# Patient Record
Sex: FEMALE | Race: BLACK OR AFRICAN AMERICAN | NOT HISPANIC OR LATINO | Employment: FULL TIME | URBAN - METROPOLITAN AREA
[De-identification: names, ages, dates, MRNs, and addresses within clinical notes are randomized per-mention and may not be internally consistent; named-entity substitution may affect disease eponyms.]

---

## 2017-03-21 ENCOUNTER — HOSPITAL ENCOUNTER (EMERGENCY)
Facility: HOSPITAL | Age: 42
Discharge: HOME | End: 2017-03-21
Attending: SURGERY
Payer: MEDICAID

## 2017-03-21 VITALS
BODY MASS INDEX: 22.54 KG/M2 | SYSTOLIC BLOOD PRESSURE: 127 MMHG | TEMPERATURE: 98 F | HEIGHT: 71 IN | DIASTOLIC BLOOD PRESSURE: 77 MMHG | WEIGHT: 161 LBS | HEART RATE: 86 BPM

## 2017-03-21 DIAGNOSIS — M54.50 CHRONIC LOW BACK PAIN WITHOUT SCIATICA, UNSPECIFIED BACK PAIN LATERALITY: ICD-10-CM

## 2017-03-21 DIAGNOSIS — G89.29 CHRONIC LOW BACK PAIN WITHOUT SCIATICA, UNSPECIFIED BACK PAIN LATERALITY: ICD-10-CM

## 2017-03-21 DIAGNOSIS — F41.9 ANXIETY: Primary | ICD-10-CM

## 2017-03-21 LAB
ALBUMIN SERPL BCP-MCNC: 4 G/DL
ALP SERPL-CCNC: 92 U/L
ALT SERPL W/O P-5'-P-CCNC: 7 U/L
AMPHET+METHAMPHET UR QL: NEGATIVE
ANION GAP SERPL CALC-SCNC: 10 MMOL/L
ANISOCYTOSIS BLD QL SMEAR: SLIGHT
APTT BLDCRRT: 31.4 SEC
AST SERPL-CCNC: 14 U/L
BARBITURATES UR QL SCN>200 NG/ML: NEGATIVE
BASOPHILS # BLD AUTO: 0 K/UL
BASOPHILS NFR BLD: 0 %
BENZODIAZ UR QL SCN>200 NG/ML: NEGATIVE
BILIRUB SERPL-MCNC: 0.7 MG/DL
BILIRUB UR QL STRIP: ABNORMAL
BNP SERPL-MCNC: 13 PG/ML
BUN SERPL-MCNC: 10 MG/DL
BZE UR QL SCN: NEGATIVE
CALCIUM SERPL-MCNC: 9.6 MG/DL
CANNABINOIDS UR QL SCN: NORMAL
CHLORIDE SERPL-SCNC: 103 MMOL/L
CK MB SERPL-MCNC: 0.4 NG/ML
CK MB SERPL-RTO: 0.6 %
CK SERPL-CCNC: 66 U/L
CK SERPL-CCNC: 66 U/L
CLARITY UR: CLEAR
CO2 SERPL-SCNC: 26 MMOL/L
COLOR UR: YELLOW
CREAT SERPL-MCNC: 0.9 MG/DL
CREAT UR-MCNC: 214.7 MG/DL
D DIMER PPP IA.FEU-MCNC: 0.39 MG/L FEU
DIFFERENTIAL METHOD: ABNORMAL
EOSINOPHIL # BLD AUTO: 0 K/UL
EOSINOPHIL NFR BLD: 0.5 %
ERYTHROCYTE [DISTWIDTH] IN BLOOD BY AUTOMATED COUNT: 14.5 %
EST. GFR  (AFRICAN AMERICAN): >60 ML/MIN/1.73 M^2
EST. GFR  (NON AFRICAN AMERICAN): >60 ML/MIN/1.73 M^2
GLUCOSE SERPL-MCNC: 91 MG/DL
GLUCOSE UR QL STRIP: NEGATIVE
HCT VFR BLD AUTO: 40.1 %
HGB BLD-MCNC: 13.1 G/DL
HGB UR QL STRIP: NEGATIVE
INR PPP: 1.1
KETONES UR QL STRIP: ABNORMAL
LEUKOCYTE ESTERASE UR QL STRIP: NEGATIVE
LYMPHOCYTES # BLD AUTO: 1 K/UL
LYMPHOCYTES NFR BLD: 22.5 %
MCH RBC QN AUTO: 24.1 PG
MCHC RBC AUTO-ENTMCNC: 32.7 %
MCV RBC AUTO: 74 FL
METHADONE UR QL SCN>300 NG/ML: NEGATIVE
MONOCYTES # BLD AUTO: 0.7 K/UL
MONOCYTES NFR BLD: 14.9 %
NEUTROPHILS # BLD AUTO: 2.7 K/UL
NEUTROPHILS NFR BLD: 62.1 %
NITRITE UR QL STRIP: NEGATIVE
OPIATES UR QL SCN: NEGATIVE
PCP UR QL SCN>25 NG/ML: NEGATIVE
PH UR STRIP: 6 [PH] (ref 5–8)
PLATELET # BLD AUTO: 307 K/UL
PLATELET BLD QL SMEAR: ABNORMAL
PMV BLD AUTO: 9.6 FL
POTASSIUM SERPL-SCNC: 4.4 MMOL/L
PROT SERPL-MCNC: 8.8 G/DL
PROT UR QL STRIP: NEGATIVE
PROTHROMBIN TIME: 10.6 SEC
RBC # BLD AUTO: 5.43 M/UL
SODIUM SERPL-SCNC: 139 MMOL/L
SP GR UR STRIP: 1.02 (ref 1–1.03)
TOXICOLOGY INFORMATION: NORMAL
TROPONIN I SERPL DL<=0.01 NG/ML-MCNC: <0.006 NG/ML
TROPONIN I SERPL DL<=0.01 NG/ML-MCNC: <0.006 NG/ML
URN SPEC COLLECT METH UR: ABNORMAL
UROBILINOGEN UR STRIP-ACNC: NEGATIVE EU/DL
WBC # BLD AUTO: 4.36 K/UL

## 2017-03-21 PROCEDURE — 81003 URINALYSIS AUTO W/O SCOPE: CPT

## 2017-03-21 PROCEDURE — 82553 CREATINE MB FRACTION: CPT

## 2017-03-21 PROCEDURE — 82570 ASSAY OF URINE CREATININE: CPT

## 2017-03-21 PROCEDURE — 99284 EMERGENCY DEPT VISIT MOD MDM: CPT | Mod: 25

## 2017-03-21 PROCEDURE — 36415 COLL VENOUS BLD VENIPUNCTURE: CPT

## 2017-03-21 PROCEDURE — 85610 PROTHROMBIN TIME: CPT

## 2017-03-21 PROCEDURE — 85379 FIBRIN DEGRADATION QUANT: CPT

## 2017-03-21 PROCEDURE — 93005 ELECTROCARDIOGRAM TRACING: CPT

## 2017-03-21 PROCEDURE — 96372 THER/PROPH/DIAG INJ SC/IM: CPT

## 2017-03-21 PROCEDURE — 63600175 PHARM REV CODE 636 W HCPCS: Performed by: SURGERY

## 2017-03-21 PROCEDURE — 83880 ASSAY OF NATRIURETIC PEPTIDE: CPT

## 2017-03-21 PROCEDURE — 85025 COMPLETE CBC W/AUTO DIFF WBC: CPT

## 2017-03-21 PROCEDURE — 80053 COMPREHEN METABOLIC PANEL: CPT

## 2017-03-21 PROCEDURE — 85730 THROMBOPLASTIN TIME PARTIAL: CPT

## 2017-03-21 PROCEDURE — 93010 ELECTROCARDIOGRAM REPORT: CPT | Mod: ,,, | Performed by: INTERNAL MEDICINE

## 2017-03-21 PROCEDURE — 84484 ASSAY OF TROPONIN QUANT: CPT

## 2017-03-21 RX ORDER — CYCLOBENZAPRINE HCL 10 MG
10 TABLET ORAL 3 TIMES DAILY PRN
Qty: 10 TABLET | Refills: 0 | Status: SHIPPED | OUTPATIENT
Start: 2017-03-21 | End: 2017-03-21

## 2017-03-21 RX ORDER — CYCLOBENZAPRINE HCL 10 MG
10 TABLET ORAL 3 TIMES DAILY PRN
Qty: 10 TABLET | Refills: 0 | Status: SHIPPED | OUTPATIENT
Start: 2017-03-21 | End: 2017-03-26

## 2017-03-21 RX ORDER — KETOROLAC TROMETHAMINE 10 MG/1
10 TABLET, FILM COATED ORAL EVERY 6 HOURS PRN
Qty: 15 TABLET | Refills: 0 | Status: SHIPPED | OUTPATIENT
Start: 2017-03-21 | End: 2017-05-20

## 2017-03-21 RX ORDER — IBUPROFEN 200 MG
200 TABLET ORAL EVERY 6 HOURS PRN
COMMUNITY
End: 2017-05-20

## 2017-03-21 RX ORDER — KETOROLAC TROMETHAMINE 10 MG/1
10 TABLET, FILM COATED ORAL EVERY 6 HOURS PRN
Qty: 15 TABLET | Refills: 0 | Status: SHIPPED | OUTPATIENT
Start: 2017-03-21 | End: 2017-03-21

## 2017-03-21 RX ORDER — KETOROLAC TROMETHAMINE 30 MG/ML
60 INJECTION, SOLUTION INTRAMUSCULAR; INTRAVENOUS
Status: COMPLETED | OUTPATIENT
Start: 2017-03-21 | End: 2017-03-21

## 2017-03-21 RX ADMIN — KETOROLAC TROMETHAMINE 60 MG: 30 INJECTION, SOLUTION INTRAMUSCULAR at 12:03

## 2017-03-21 NOTE — ED TRIAGE NOTES
"Patient comes in today complaining of low back pain.  Pain is chronic but has gotten worse lately.  Patient works as a CNA.  She then states she has been having pain to her shoulder then indicates across the chest to both shoulders.  States she feels like she feels like she is "trying to have anxiety".  "

## 2017-03-21 NOTE — DISCHARGE INSTRUCTIONS
Understanding Anxiety Disorders  Almost everyone gets nervous now and then. Its normal to have knots in your stomach before a test, or for your heart to race on a first date. But an anxiety disorder is much more than a case of nerves. In fact, its symptoms may be overwhelming. But treatment can relieve many of these symptoms. Talking to your doctor is the first step.    What are anxiety disorders?  An anxiety disorder causes intense feelings of panic and fear. These feelings may arise for no apparent reason. And they tend to recur again and again. They may prevent you from coping with life and cause you great distress. As a result, you may avoid anything that triggers your fear. In extreme cases, you may never leave the house. Anxiety disorders may cause other symptoms, such as:  · Obsessive thoughts you cant control  · Constant nightmares or painful thoughts of the past  · Nausea, sweating, and muscle tension  · Difficulty sleeping or concentrating  What causes anxiety disorders?  Anxiety disorders tend to run in families. For some people, childhood abuse or neglect may play a role. For others, stressful life events or trauma may trigger anxiety disorders. Anxiety can trigger low self-esteem and poor coping skills.  Common anxiety disorders  · Panic disorder: This causes an intense fear of being in danger.  · Phobias: These are extreme fears of certain objects, places, or events.  · Obsessive-compulsive disorder: This causes you to have unwanted thoughts. You also may perform certain actions over and over.  · Posttraumatic stress disorder: This occurs in people who have survived a terrible ordeal. It can cause nightmares and flashbacks about the event.  · Generalized anxiety disorder: This causes constant worry that can greatly disrupt your life.   Getting better  You may believe that nothing can help you. Or, you might fear what others may think. But most anxiety symptoms can be eased. Having an anxiety  disorder is nothing to be ashamed of. Most people do best with treatment that combines medication and therapy. Although these arent cures, they can help you live a healthier life.  Date Last Reviewed: 2/11/2015  © 4592-6484 [employer]. [address] [place] [address], [place] [address]. All rights reserved. This information is not intended as a substitute for professional medical care. Always follow your healthcare professional's instructions.

## 2017-03-21 NOTE — ED PROVIDER NOTES
[facility]                                        March 21, 2017                   Chief Complaint  41 y.o. female with Back Pain and Anxiety    History of Present Illness  Destiny Moura presents to the emergency room with chronic low back pain issues  Patient states she has a history of scoliosis with chronic low back pain this past week  Patient has no lumbar pain on exam without step-off or deformity today, no bruising   Normal bowel movements and urination reported; no signs of cauda equina syndrome  Patient has good distal pulses and capillary refill, is neurovascular intact on exam now  Denies any acute trauma fall, this is chronic back pain that has been on & off for years  Patient also states she felt anxious this morning, normal sinus rhythm on EKG today  Patient has no cardiac history, states the pressure life have left her feeling anxious   Negative cardiac workup with a negative d-dimer 2 sets of cardiac enzymes in the ER    The history is provided by the patient  Medical history: Chlamydia and depression  Surgical history: D&C, hysterosalpingogram, pelvic laparoscopy  No Known Allergies     Review of Systems and Physical Exam     Review of Systems  -- Constitution - no fever, denies fatigue, no weakness, no chills  -- Eyes - no tearing or redness, no visual disturbance  -- Ear, Nose - no tinnitus or earache, no nasal congestion or discharge  -- Mouth,Throat - no sore throat, no toothache, normal voice, normal swallowing  -- Respiratory - denies cough and congestion, no shortness of breath, no TORRES  -- Cardiovascular - chest pain, no palpitations, denies claudication  -- Gastrointestinal - denies abdominal pain, nausea, vomiting, or diarrhea  -- Genitourinary - no dysuria, no denies flank pain, no hematuria or frequency   -- Musculoskeletal - back pain, negative for myalgias and arthralgias   -- Neurological - no headache, denies weakness or seizure; no LOC  -- Skin - denies pallor,  rash, or changes in skin. no hives or welts noted    Vital Signs  -- Her oral temperature is 97.9 °F (36.6 °C).  -- Her blood pressure is 127/77 and her pulse is 86.      Physical Exam  -- Nursing note and vitals reviewed  -- Constitutional: Appears well-developed and well-nourished  -- Head: Atraumatic. Normocephalic. No obvious abnormality  -- Eyes: Pupils are equal and reactive to light. Normal conjunctiva and lids  -- Cardiac: Normal rate, regular rhythm and normal heart sounds  -- Pulmonary: Normal respiratory effort, breath sounds clear to auscultation  -- Abdominal: Soft, no tenderness. Normal bowel sounds. Normal liver edge  -- Musculoskeletal: Normal range of motion, no effusions. Joints stable   -- Neurological: No focal deficits. Showed good interaction with staff  -- Vascular: Posterior tibial, dorsalis pedis and radial pulses 2+ bilaterally      Emergency Room Course     Treatment and Evaluation  -- The EKG findings today were without concerning findings from baseline   -- Chest x-ray showed no infiltrate and showed no acute pathology   -- L-spine x-rays showed no evidence of acute fracture or dislocation    -- The electrolytes drawn in the ER today were within normal limits   -- The CBC drawn in the ER today was within normal limits   -- The BNP drawn in the ER today were within normal limits   -- The d-dimer drawn in the ER today were within normal limits.   -- The PT, PTT, and INR were within normal limits.    -- The cardiac enzymes were within normal limits   -- The 2nd troponin drawn in the ER today was within normal limits    -- IM Toradol given today in the ER    Diagnosis  -- Anxiety  -- Chronic low back pain without sciatica    Disposition and Plan  -- Disposition: home  -- Condition: stable  -- Follow-up: Patient to follow up with a MD in 1-2 days.  -- I advised the patient that we have found no life threatening condition today  -- At this time, I believe the patient is clinically stable for  discharge.   -- The patient acknowledges that close follow up with a MD is required   -- Patient agrees to comply with all instruction and direction given in the ER    This note is dictated on Dragon Natural Speaking word recognition program.  There are word recognition mistakes that are occasionally missed on review.             Henrik Allen MD  03/21/17 9915

## 2017-03-21 NOTE — ED AVS SNAPSHOT
[facility]  [address] [id number]               Destiny Moura   3/21/2017 10:15 AM   ED    Description:  Female : 1975   Department:  [facility]           Your Care was Coordinated By:     Provider Role From To    Henrik Allen MD Attending Provider 17 1013 --      Reason for Visit     Back Pain     Anxiety           Diagnoses this Visit        Comments    Anxiety    -  Primary     Chronic low back pain without sciatica, unspecified back pain laterality           ED Disposition     ED Disposition Condition Comment    Discharge             To Do List            These Medications        Disp Refills Start End    ketorolac (TORADOL) 10 mg tablet 15 tablet 0 3/21/2017     Take 1 tablet (10 mg total) by mouth every 6 (six) hours as needed for Pain. - Oral    Pharmacy: [facility] [id number] Ph #: [phone]       cyclobenzaprine (FLEXERIL) 10 MG tablet 10 tablet 0 3/21/2017 3/26/2017    Take 1 tablet (10 mg total) by mouth 3 (three) times daily as needed for Muscle spasms. - Oral    [id number] [address] Ph #: [phone]         [facility]     [facility] -  Assistance  Registered nurses in the [facility] provide clinical advisement, health education, appointment booking, and other advisory services.  Call for this free service at [phone].             Medications           Message regarding Medications     Verify the changes and/or additions to your medication regime listed below are the same as discussed with your clinician today.  If any of these changes or additions are incorrect, please notify your healthcare provider.        START taking these NEW medications        Refills    ketorolac (TORADOL) 10 mg tablet 0    Sig: Take 1 tablet (10 mg total) by mouth every 6 (six) hours as needed for Pain.  "   Class: Normal    Route: Oral    cyclobenzaprine (FLEXERIL) 10 MG tablet 0    Sig: Take 1 tablet (10 mg total) by mouth 3 (three) times daily as needed for Muscle spasms.    Class: Normal    Route: Oral      These medications were administered today        Dose Freq    ketorolac injection 60 mg 60 mg ED 1 Time    Sig: Inject 2 mLs (60 mg total) into the muscle ED 1 Time.    Class: Normal    Route: Intramuscular           Verify that the below list of medications is an accurate representation of the medications you are currently taking.  If none reported, the list may be blank. If incorrect, please contact your healthcare provider. Carry this list with you in case of emergency.           Current Medications     ibuprofen (ADVIL,MOTRIN) 200 MG tablet Take 200 mg by mouth every 6 (six) hours as needed for Pain.    cyclobenzaprine (FLEXERIL) 10 MG tablet Take 1 tablet (10 mg total) by mouth 3 (three) times daily as needed for Muscle spasms.    ketorolac (TORADOL) 10 mg tablet Take 1 tablet (10 mg total) by mouth every 6 (six) hours as needed for Pain.           Clinical Reference Information           Your Vitals Were     BP Pulse Temp Height Weight Last Period    127/77 (BP Location: Left arm, Patient Position: Sitting) 86 97.9 °F (36.6 °C) (Oral) 5' 11" (1.803 m) 73 kg (161 lb) 02/28/2017    BMI                22.45 kg/m2          Allergies as of 3/21/2017     No Known Allergies      Immunizations Administered on Date of Encounter - 3/21/2017     None      ED Micro, Lab, POCT     Start Ordered       Status Ordering Provider    03/21/17 1201 03/21/17 1200  Troponin I  STAT      Final result     03/21/17 1026 03/21/17 1025  Comprehensive metabolic panel  STAT      Final result     03/21/17 1026 03/21/17 1025  CBC auto differential  STAT      Final result     03/21/17 1026 03/21/17 1025  Troponin I  Now then every 6 hours     Start Status   03/21/17 1026 Final result   03/21/17 1626 Scheduled   03/21/17 2226 Scheduled "   03/22/17 0426 Scheduled   03/22/17 1026 Scheduled   03/22/17 1626 Scheduled   03/22/17 2226 Scheduled   03/23/17 0426 Scheduled   03/23/17 1026 Scheduled   03/23/17 1626 Scheduled   03/23/17 2226 Scheduled   03/24/17 0426 Scheduled   03/24/17 1026 Scheduled   03/24/17 1626 Scheduled   03/24/17 2226 Scheduled       Acknowledged     03/21/17 1026 03/21/17 1025  CK  STAT      Final result     03/21/17 1026 03/21/17 1025  CK-MB  STAT      Final result     03/21/17 1026 03/21/17 1025  Brain natriuretic peptide  STAT      Final result     03/21/17 1026 03/21/17 1025  Protime-INR  STAT      Final result     03/21/17 1026 03/21/17 1025  APTT  STAT      Final result     03/21/17 1026 03/21/17 1025  D dimer, quantitative  STAT      Final result     03/21/17 1026 03/21/17 1025  Urinalysis  STAT      Final result     03/21/17 1026 03/21/17 1025  Drug screen panel, emergency  STAT      Final result       ED Imaging Orders     Start Ordered       Status Ordering Provider    03/21/17 1026 03/21/17 1025  X-Ray Chest PA And Lateral  1 time imaging      Final result     03/21/17 1022 03/21/17 1022  X-Ray Lumbar Spine Ap And Lateral  Daily imaging      Final result         Discharge Instructions         Understanding Anxiety Disorders  Almost everyone gets nervous now and then. Its normal to have knots in your stomach before a test, or for your heart to race on a first date. But an anxiety disorder is much more than a case of nerves. In fact, its symptoms may be overwhelming. But treatment can relieve many of these symptoms. Talking to your doctor is the first step.    What are anxiety disorders?  An anxiety disorder causes intense feelings of panic and fear. These feelings may arise for no apparent reason. And they tend to recur again and again. They may prevent you from coping with life and cause you great distress. As a result, you may avoid anything that triggers your fear. In extreme cases, you may never leave the house.  Anxiety disorders may cause other symptoms, such as:  · Obsessive thoughts you cant control  · Constant nightmares or painful thoughts of the past  · Nausea, sweating, and muscle tension  · Difficulty sleeping or concentrating  What causes anxiety disorders?  Anxiety disorders tend to run in families. For some people, childhood abuse or neglect may play a role. For others, stressful life events or trauma may trigger anxiety disorders. Anxiety can trigger low self-esteem and poor coping skills.  Common anxiety disorders  · Panic disorder: This causes an intense fear of being in danger.  · Phobias: These are extreme fears of certain objects, places, or events.  · Obsessive-compulsive disorder: This causes you to have unwanted thoughts. You also may perform certain actions over and over.  · Posttraumatic stress disorder: This occurs in people who have survived a terrible ordeal. It can cause nightmares and flashbacks about the event.  · Generalized anxiety disorder: This causes constant worry that can greatly disrupt your life.   Getting better  You may believe that nothing can help you. Or, you might fear what others may think. But most anxiety symptoms can be eased. Having an anxiety disorder is nothing to be ashamed of. Most people do best with treatment that combines medication and therapy. Although these arent cures, they can help you live a healthier life.  Date Last Reviewed: 2/11/2015 © 2000-2016 [employer]. [address] [place] [address], [place] [address]. All rights reserved. This information is not intended as a substitute for professional medical care. Always follow your healthcare professional's instructions.           [facility] complies with applicable Federal civil rights laws and does not discriminate on the basis of race, color, national origin, age, disability, or sex.        Language Assistance Services     ATTENTION: Language assistance services are available, free of  charge. Please call [phone].      ATENCIÓN: Si habla español, tiene a greco disposición servicios gratuitos de asistencia lingüística. Llame al [phone].     CHÚ Ý: N?u b?n nói Ti?ng Vi?t, có các d?ch v? h? tr? ngôn ng? mi?n phí dành cho b?n. G?i s? [phone].

## 2017-03-24 ENCOUNTER — TELEPHONE (OUTPATIENT)
Dept: OBSTETRICS AND GYNECOLOGY | Facility: CLINIC | Age: 42
End: 2017-03-24

## 2017-03-24 NOTE — TELEPHONE ENCOUNTER
----- Message from [place] sent at 3/24/2017  9:14 AM CDT -----  Contact: self  Destiny Moura  MRN: [id number]  : 1975  PCP: Primary Doctor No  Home Phone      [phone]  Work Phone      Not on file.  Mobile          [phone]      MESSAGE: had come here in  she was trying to get pregnant, barbara wanted her to have a test done here(hsg?) but it was $600, she had it done at [facility] in [place] so medicaid would pay for it. Please call to discuss her results. She has a copy of the results.    Phone: [phone]

## 2017-03-24 NOTE — TELEPHONE ENCOUNTER
Pt instructed to bring in report for MD review. Once reviewed, will advise of further recommendations. Verbalized understanding. Will bring in report to clinic.

## 2017-03-24 NOTE — TELEPHONE ENCOUNTER
Called patient to discuss results from her GYN in [facility]. Patient did not answer.     Please let patient know I have reviewed all the records. Her lab work was normal and her FSH/estradiol levels indicates she is not in menopause. Her HSG however states that the right fallopian tube is dilated and the left fallopian tube is blocked. This is very likely the cause of her inability to get pregnancy. Without the tubes being open, there is no way for the sperm to travel to the egg for conception. There isn't anything further that we can offer her to help her get pregnant. She will need to be seen at a fertility specialist for potential IVF.

## 2017-03-24 NOTE — TELEPHONE ENCOUNTER
Pt notified, verbalized understanding. Will contact fertility specialist for further evaluation.

## 2017-05-20 ENCOUNTER — HOSPITAL ENCOUNTER (EMERGENCY)
Facility: HOSPITAL | Age: 42
Discharge: HOME | End: 2017-05-20
Attending: SURGERY
Payer: MEDICAID

## 2017-05-20 VITALS
DIASTOLIC BLOOD PRESSURE: 65 MMHG | SYSTOLIC BLOOD PRESSURE: 99 MMHG | WEIGHT: 168 LBS | BODY MASS INDEX: 23.43 KG/M2 | HEART RATE: 97 BPM | RESPIRATION RATE: 20 BRPM | TEMPERATURE: 99 F

## 2017-05-20 DIAGNOSIS — K02.9 DENTAL CARIES: Primary | ICD-10-CM

## 2017-05-20 PROCEDURE — 63600175 PHARM REV CODE 636 W HCPCS: Performed by: SURGERY

## 2017-05-20 PROCEDURE — 96372 THER/PROPH/DIAG INJ SC/IM: CPT

## 2017-05-20 PROCEDURE — 25000003 PHARM REV CODE 250: Performed by: SURGERY

## 2017-05-20 PROCEDURE — 99283 EMERGENCY DEPT VISIT LOW MDM: CPT | Mod: 25

## 2017-05-20 RX ORDER — TRAMADOL HYDROCHLORIDE 50 MG/1
50 TABLET ORAL EVERY 6 HOURS PRN
Qty: 8 TABLET | Refills: 0 | Status: SHIPPED | OUTPATIENT
Start: 2017-05-20 | End: 2017-05-20

## 2017-05-20 RX ORDER — TRAMADOL HYDROCHLORIDE 50 MG/1
50 TABLET ORAL EVERY 6 HOURS PRN
Qty: 8 TABLET | Refills: 0 | Status: SHIPPED | OUTPATIENT
Start: 2017-05-20 | End: 2017-05-30

## 2017-05-20 RX ORDER — PENICILLIN V POTASSIUM 500 MG/1
500 TABLET, FILM COATED ORAL EVERY 6 HOURS
Qty: 28 TABLET | Refills: 0 | Status: SHIPPED | OUTPATIENT
Start: 2017-05-20 | End: 2017-07-15

## 2017-05-20 RX ORDER — KETOROLAC TROMETHAMINE 30 MG/ML
60 INJECTION, SOLUTION INTRAMUSCULAR; INTRAVENOUS
Status: COMPLETED | OUTPATIENT
Start: 2017-05-20 | End: 2017-05-20

## 2017-05-20 RX ORDER — CLINDAMYCIN PHOSPHATE 150 MG/ML
600 INJECTION, SOLUTION INTRAVENOUS
Status: COMPLETED | OUTPATIENT
Start: 2017-05-20 | End: 2017-05-20

## 2017-05-20 RX ORDER — KETOROLAC TROMETHAMINE 10 MG/1
10 TABLET, FILM COATED ORAL EVERY 6 HOURS PRN
Qty: 20 TABLET | Refills: 0 | Status: SHIPPED | OUTPATIENT
Start: 2017-05-20 | End: 2017-07-15

## 2017-05-20 RX ADMIN — KETOROLAC TROMETHAMINE 60 MG: 30 INJECTION, SOLUTION INTRAMUSCULAR at 10:05

## 2017-05-20 RX ADMIN — CLINDAMYCIN PHOSPHATE 600 MG: 150 INJECTION, SOLUTION INTRAMUSCULAR; INTRAVENOUS at 10:05

## 2017-05-20 NOTE — DISCHARGE INSTRUCTIONS
"    Dental Cavity    A dental cavity is a pit or crater in the surface of a tooth. This exposes the sensitive inner layer of the tooth and causes pain. If the cavity isnt treated, it will get bigger. It may enter the pulp and cause an infection or abscess in the bone at the root end (apex) of the tooth. An infection in the tooth is a much more serious problem than a cavity. If the tooth gets infected, you will need a root canal or the entire tooth taken out (extraction).  The pain in your tooth may be made worse by eating sweets or drinking hot or cold beverages. It may spread from the tooth to your ear or the area of your jaw on the same side.  Home care  Follow these tips when caring for yourself at home:  · Avoid sweets and hot and cold foods and drinks. Your tooth may be sensitive to changes in temperature.  · If your tooth is chipped or cracked, or if there is a large open cavity, put oil of cloves directly on the tooth to relieve pain. You can buy oil of cloves at drugstores. Some pharmacies carry an over-the-counter "toothache kit." This contains a paste that you can put on the exposed tooth to make it less sensitive.  · Put a cold pack on your jaw over the sore area to help reduce pain.  · You may use over-the-counter medicine to ease pain, unless another medicine was prescribed. If you have chronic liver or kidney disease, talk with your healthcare provider before using acetaminophen or ibuprofen. Also talk with your provider if youve had a stomach ulcer or GI bleeding.  · If you have signs of an infection, you will be given an antibiotic. Take it as directed.  Follow-up care  Follow up with your dentist, or as advised. Your pain may go away with the treatment given today. But only a dentist can fully look at and treat this problem to prevent further tooth damage.  Call 911  Call 911 if any of these occur:  · Difficulty swallowing or breathing  · Weakness or fainting  · Unusual drowsiness  · Headache or " stiff neck  When to seek medical advice  Call your healthcare provider right away if any of these occur:  · Redness or swelling of the face  · Pain gets worse or spreads to your neck  · Fever of 100.5 °F (38°C) or higher, or as directed by your healthcare provider  · Pus drains from the tooth or gum  Date Last Reviewed: 10/1/2016  © 6299-5982 [employer]. [address] [place] [address], [place] [address]. All rights reserved. This information is not intended as a substitute for professional medical care. Always follow your healthcare professional's instructions.

## 2017-05-20 NOTE — ED PROVIDER NOTES
"[facility]                                        May 20, 2017                   Chief Complaint  41 y.o. female with Dental Pain    History of Present Illness  Destiny Moura presents to the emergency room with chronic dental pain  Patient states she had a left upper molar extracted several months ago  Patient states now the adjacent to molars hurt, no facial swelling now  Patient has no fever or signs of dental abscess on ER evaluation today  Patient states she has 5/10 pain with chewing, "Toradol does not help"  Patient has normal swallowing with no infection of the soft tissue or gums  Patient states she does not have the financial opportunity to see a dentist    The history is provided by the patient    Past Medical History   -- Chlamydia    -- Depression      Past Surgical History   -- DILATION AND CURETTAGE OF UTERUS     -- hysterosalpingogram     -- PELVIC LAPAROSCOPY        No Known Allergies     Review of Systems and Physical Exam     Review of Systems  -- Constitution - no fever, denies fatigue, no weakness, no chills  -- Eyes - no tearing or redness, no visual disturbance  -- Ear, Nose - no tinnitus or earache, no nasal congestion or discharge  -- Mouth,Throat - toothache, normal voice, normal swallowing  -- Respiratory - denies cough and congestion, no shortness of breath, no TORRES  -- Cardiovascular - denies chest pain, no palpitations, denies claudication  -- Gastrointestinal - denies abdominal pain, nausea, vomiting, or diarrhea  -- Musculoskeletal - denies back pain, negative for myalgias and arthralgias   -- Neurological - no headache, denies weakness or seizure; no LOC  -- Skin - denies pallor, rash, or changes in skin. no hives or welts noted    Vital Signs  -- Her blood pressure is 99/65 and her pulse is 97. Her respiration is 20.      Physical Exam  -- Nursing note and vitals reviewed  -- Constitutional: Appears well-developed and well-nourished  -- Head: Atraumatic. " Normocephalic. No obvious abnormality  -- Eyes: Pupils are equal and reactive to light. Normal conjunctiva and lids  -- Nose: Nose normal in appearance, nares grossly normal. No discharge  -- Throat: several upper and lower bilateral molar cavities with no facial swelling     -- Ears: External ears and TM normal bilaterally. Normal hearing and no drainage  -- Neck: Normal range of motion. Neck supple. No masses, trachea midline  -- Cardiac: Normal rate, regular rhythm and normal heart sounds  -- Pulmonary: Normal respiratory effort, breath sounds clear to auscultation  -- Abdominal: Soft, no tenderness. Normal bowel sounds. Normal liver edge  -- Musculoskeletal: Normal range of motion, no effusions. Joints stable   -- Neurological: No focal deficits. Showed good interaction with staff  -- Vascular: Posterior tibial, dorsalis pedis and radial pulses 2+ bilaterally      Emergency Room Course     Medications Given  -- clindamycin injection 600 mg (600 mg Intramuscular Given 5/20/17 1014)   -- ketorolac injection 60 mg (60 mg Intramuscular Given 5/20/17 1014)     Diagnosis  -- The encounter diagnosis was Dental caries.    Disposition and Plan  -- Disposition: to dentist ASAP  -- Condition: stable  -- Pt given instructions; take all medications prescribed in the ER as directed.   -- Patient agrees to comply with all instructions- needs appropriate dental care  -- Pt agrees to return to ER if any symptoms reoccur; symptom-free on discharge  -- Patient to follow up with a dentist in 1-2 days. Has been given follow up information  -- The patient acknowledges that dental follow up is required for this issue    This note is dictated on Dragon Natural Speaking word recognition program.  There are word recognition mistakes that are occasionally missed on review.           Henrik Allen MD  05/20/17 2233

## 2017-05-20 NOTE — ED AVS SNAPSHOT
[facility]  [address] [facility] [address]               Destiny Moura   2017  9:51 AM   ED    Description:  Female : 1975   Department:  [facility]           Your Care was Coordinated By:     Provider Role From To    Henrik Allen MD Attending Provider 17 0959 --      Reason for Visit     Dental Pain           Diagnoses this Visit        Comments    Dental caries    -  Primary       ED Disposition     ED Disposition Condition Comment    Discharge             To Do List           Follow-up Information     Follow up with [facility] In 2 day(s).    Specialty:  Dental General Practice    Contact information:    [address] [address]  [phone]         These Medications        Disp Refills Start End    penicillin v potassium (VEETID) 500 MG tablet 28 tablet 0 2017     Take 1 tablet (500 mg total) by mouth every 6 (six) hours. - Oral    Pharmacy: [facility] [address] [facility] [address] Ph #: [phone]       ketorolac (TORADOL) 10 mg tablet 20 tablet 0 2017     Take 1 tablet (10 mg total) by mouth every 6 (six) hours as needed for Pain. - Oral    Pharmacy: [facility] - [address] - [address] Ph #: [phone]       tramadol (ULTRAM) 50 mg tablet 8 tablet 0 2017    Take 1 tablet (50 mg total) by mouth every 6 (six) hours as needed for Pain. - Oral    Pharmacy: [facility] [address] - [address] [facility] [address] Ph #: [phone]         [facility]     [facility] -  Assistance  Unless otherwise directed by your provider, please contact [facility], our nurse care line that is available for  assistance.     Registered nurses in the [facility] provide: appointment scheduling, clinical advisement, health education, and other advisory services.  Call: [phone] (toll free)                Medications           Message regarding Medications     Verify the changes and/or additions to your medication regime listed below are the same as discussed with your clinician today.  If any of these changes or additions are incorrect, please notify your healthcare provider.        START taking these NEW medications        Refills    penicillin v potassium (VEETID) 500 MG tablet 0    Sig: Take 1 tablet (500 mg total) by mouth every 6 (six) hours.    Class: Print    Route: Oral    ketorolac (TORADOL) 10 mg tablet 0    Sig: Take 1 tablet (10 mg total) by mouth every 6 (six) hours as needed for Pain.    Class: Print    Route: Oral    tramadol (ULTRAM) 50 mg tablet 0    Sig: Take 1 tablet (50 mg total) by mouth every 6 (six) hours as needed for Pain.    Class: Normal    Route: Oral      These medications were administered today        Dose Freq    clindamycin injection 600 mg 600 mg ED 1 Time    Sig: Inject 4 mLs (600 mg total) into the muscle ED 1 Time.    Class: Normal    Route: Intramuscular    ketorolac injection 60 mg 60 mg ED 1 Time    Sig: Inject 2 mLs (60 mg total) into the muscle ED 1 Time.    Class: Normal    Route: Intramuscular      STOP taking these medications     ibuprofen (ADVIL,MOTRIN) 200 MG tablet Take 200 mg by mouth every 6 (six) hours as needed for Pain.           Verify that the below list of medications is an accurate representation of the medications you are currently taking.  If none reported, the list may be blank. If incorrect, please contact your healthcare provider. Carry this list with you in case of emergency.           Current Medications     ketorolac (TORADOL) 10 mg tablet Take 1 tablet (10 mg total) by mouth every 6 (six) hours as needed for Pain.    penicillin v potassium (VEETID) 500 MG tablet Take 1 tablet (500 mg total) by mouth every 6 (six) hours.    tramadol (ULTRAM) 50 mg tablet Take 1 tablet (50 mg total) by mouth every 6 (six) hours as needed for Pain.           Clinical  "Reference Information           Your Vitals Were     BP Pulse Temp Resp Weight Last Period    99/65 (BP Location: Left arm) 97 98.8 °F (37.1 °C) (Oral) 20 76.2 kg (168 lb) 04/29/2017    BMI                23.43 kg/m2          Allergies as of 5/20/2017     No Known Allergies      Immunizations Administered on Date of Encounter - 5/20/2017     None      ED Micro, Lab, POCT     None      ED Imaging Orders     None        Discharge Instructions           Dental Cavity    A dental cavity is a pit or crater in the surface of a tooth. This exposes the sensitive inner layer of the tooth and causes pain. If the cavity isnt treated, it will get bigger. It may enter the pulp and cause an infection or abscess in the bone at the root end (apex) of the tooth. An infection in the tooth is a much more serious problem than a cavity. If the tooth gets infected, you will need a root canal or the entire tooth taken out (extraction).  The pain in your tooth may be made worse by eating sweets or drinking hot or cold beverages. It may spread from the tooth to your ear or the area of your jaw on the same side.  Home care  Follow these tips when caring for yourself at home:  · Avoid sweets and hot and cold foods and drinks. Your tooth may be sensitive to changes in temperature.  · If your tooth is chipped or cracked, or if there is a large open cavity, put oil of cloves directly on the tooth to relieve pain. You can buy oil of cloves at drugstores. Some pharmacies carry an over-the-counter "toothache kit." This contains a paste that you can put on the exposed tooth to make it less sensitive.  · Put a cold pack on your jaw over the sore area to help reduce pain.  · You may use over-the-counter medicine to ease pain, unless another medicine was prescribed. If you have chronic liver or kidney disease, talk with your healthcare provider before using acetaminophen or ibuprofen. Also talk with your provider if youve had a stomach ulcer or GI " bleeding.  · If you have signs of an infection, you will be given an antibiotic. Take it as directed.  Follow-up care  Follow up with your dentist, or as advised. Your pain may go away with the treatment given today. But only a dentist can fully look at and treat this problem to prevent further tooth damage.  Call 911  Call 911 if any of these occur:  · Difficulty swallowing or breathing  · Weakness or fainting  · Unusual drowsiness  · Headache or stiff neck  When to seek medical advice  Call your healthcare provider right away if any of these occur:  · Redness or swelling of the face  · Pain gets worse or spreads to your neck  · Fever of 100.5 °F (38°C) or higher, or as directed by your healthcare provider  · Pus drains from the tooth or gum  Date Last Reviewed: 10/1/2016  © 9966-8475 [employer]. [address] [place] [address]. All rights reserved. This information is not intended as a substitute for professional medical care. Always follow your healthcare professional's instructions.           [facility] complies with applicable Federal civil rights laws and does not discriminate on the basis of race, color, national origin, age, disability, or sex.        Language Assistance Services     ATTENTION: Language assistance services are available, free of charge. Please call [phone].      ATENCIÓN: Si habla brandon, tiene a greco disposición servicios gratuitos de asistencia lingüística. Llame al [phone].     JUAN Ý: N?u b?n nói Ti?ng Vi?t, có các d?ch v? h? tr? ngôn ng? mi?n phí dành cho b?n. G?i s? [phone].

## 2017-07-15 ENCOUNTER — HOSPITAL ENCOUNTER (EMERGENCY)
Facility: HOSPITAL | Age: 42
Discharge: HOME | End: 2017-07-15
Attending: SURGERY
Payer: MEDICAID

## 2017-07-15 VITALS
BODY MASS INDEX: 24.05 KG/M2 | SYSTOLIC BLOOD PRESSURE: 128 MMHG | DIASTOLIC BLOOD PRESSURE: 61 MMHG | TEMPERATURE: 99 F | HEIGHT: 70 IN | HEART RATE: 103 BPM | WEIGHT: 168 LBS

## 2017-07-15 DIAGNOSIS — R21 RASH AND NONSPECIFIC SKIN ERUPTION: Primary | ICD-10-CM

## 2017-07-15 PROCEDURE — 63600175 PHARM REV CODE 636 W HCPCS: Performed by: SURGERY

## 2017-07-15 PROCEDURE — 96372 THER/PROPH/DIAG INJ SC/IM: CPT

## 2017-07-15 PROCEDURE — 99283 EMERGENCY DEPT VISIT LOW MDM: CPT

## 2017-07-15 RX ORDER — CEPHALEXIN 500 MG/1
500 CAPSULE ORAL EVERY 6 HOURS
Qty: 28 CAPSULE | Refills: 0 | Status: SHIPPED | OUTPATIENT
Start: 2017-07-15 | End: 2017-07-22

## 2017-07-15 RX ORDER — DIPHENHYDRAMINE HCL 50 MG
50 CAPSULE ORAL EVERY 6 HOURS PRN
Qty: 20 CAPSULE | Refills: 0 | Status: ON HOLD | OUTPATIENT
Start: 2017-07-15 | End: 2019-06-20 | Stop reason: HOSPADM

## 2017-07-15 RX ORDER — METHYLPREDNISOLONE 4 MG/1
TABLET ORAL
Qty: 1 PACKAGE | Refills: 0 | Status: ON HOLD | OUTPATIENT
Start: 2017-07-15 | End: 2019-06-16 | Stop reason: CLARIF

## 2017-07-15 RX ORDER — METHYLPREDNISOLONE SOD SUCC 125 MG
125 VIAL (EA) INJECTION
Status: COMPLETED | OUTPATIENT
Start: 2017-07-15 | End: 2017-07-15

## 2017-07-15 RX ORDER — MELOXICAM 15 MG/1
15 TABLET ORAL DAILY
Status: ON HOLD | COMMUNITY
End: 2019-06-16 | Stop reason: CLARIF

## 2017-07-15 RX ORDER — DIPHENHYDRAMINE HYDROCHLORIDE 50 MG/ML
50 INJECTION INTRAMUSCULAR; INTRAVENOUS
Status: COMPLETED | OUTPATIENT
Start: 2017-07-15 | End: 2017-07-15

## 2017-07-15 RX ORDER — QUETIAPINE FUMARATE 25 MG/1
TABLET, FILM COATED ORAL
Status: ON HOLD | COMMUNITY
End: 2019-06-20 | Stop reason: HOSPADM

## 2017-07-15 RX ADMIN — METHYLPREDNISOLONE SODIUM SUCCINATE 125 MG: 125 INJECTION, POWDER, FOR SOLUTION INTRAMUSCULAR; INTRAVENOUS at 03:07

## 2017-07-15 RX ADMIN — DIPHENHYDRAMINE HYDROCHLORIDE 50 MG: 50 INJECTION, SOLUTION INTRAMUSCULAR; INTRAVENOUS at 03:07

## 2017-07-15 NOTE — ED PROVIDER NOTES
[facility]                                        July 15, 2017                   Chief Complaint  42 y.o. female with Rash (rash along hairline for 2 days)    History of Present Illness  Destiny Moura presents to the emergency room with hairline rash ×48 hours  Patient got her hair black 2 days ago with itching and pruritus since that dying  Patient on exam has mild dermatitis on the scalp, mild random alopecia now  Patient has no history of psoriasis or eczema, only started after doing her hair  Patient has no cellulitis, no signs of scalp desiccation on ER evaluation today    The history is provided by the patient  Medical history: Chlamydia and depression  Surgical history: D&C, hysterosalpingogram, pelvic laparoscopy  No known ALLERGIES  Social history: , employed, nonsmoker, children  Family history: Thyroid disease,    Review of Systems and Physical Exam     Review of Systems  -- Constitution - no fever, denies fatigue, no weakness, no chills  -- Eyes - no tearing or redness, no visual disturbance  -- Ear, Nose - no tinnitus or earache, no nasal congestion or discharge  -- Mouth,Throat - no sore throat, no toothache, normal voice, normal swallowing  -- Respiratory - denies cough and congestion, no shortness of breath, no TORRES  -- Cardiovascular - denies chest pain, no palpitations, denies claudication  -- Gastrointestinal - denies abdominal pain, nausea, vomiting, or diarrhea  -- Musculoskeletal - denies back pain, negative for myalgias and arthralgias   -- Neurological - no headache, denies weakness or seizure; no LOC  -- Skin - scalp rash after dying her hair black 2 days ago    Vital Signs  -- Her oral temperature is 98.6 °F (37 °C).   -- Her blood pressure is 128/61 and her pulse is 103.      Physical Exam  -- Nursing note and vitals reviewed  -- Constitutional: Appears well-developed and well-nourished  -- Head: Atraumatic. Normocephalic. No obvious abnormality  -- Eyes: Pupils  are equal and reactive to light. Normal conjunctiva and lids  -- Nose: Nose normal in appearance, nares grossly normal. No discharge  -- Throat: Mucous membranes moist, pharynx normal, normal tonsils. No lesions   -- Ears: External ears and TM normal bilaterally. Normal hearing and no drainage  -- Neck: Normal range of motion. Neck supple. No masses, trachea midline  -- Cardiac: Normal rate, regular rhythm and normal heart sounds  -- Pulmonary: Normal respiratory effort, breath sounds clear to auscultation  -- Abdominal: Soft, no tenderness. Normal bowel sounds. Normal liver edge  -- Musculoskeletal: Normal range of motion, no effusions. Joints stable   -- Neurological: No focal deficits. Showed good interaction with staff  -- Vascular: Posterior tibial, dorsalis pedis and radial pulses 2+ bilaterally    -- Lymphatics: No cervical or peripheral lymphadenopathy. No edema noted  -- Skin: Nonspecific scalp rash/dermatitis    Emergency Room Course     Treatment and Evaluation  --  mg Solumedrol given today in the ER  -- IM 50 mg Benadryl given today in the ER    Diagnosis  -- The encounter diagnosis was Rash and nonspecific skin eruption.    Disposition and Plan  -- Disposition: home  -- Condition: stable  -- Follow-up: Patient to follow up with a MD in 1-2 days.  -- I advised the patient that we have found no life threatening condition today  -- At this time, I believe the patient is clinically stable for discharge.   -- The patient acknowledges that close follow up with a MD is required   -- Patient agrees to comply with all instruction and direction given in the ER    This note is dictated on Dragon Natural Speaking word recognition program.  There are word recognition mistakes that are occasionally missed on review.           Henrik Allen MD  07/15/17 4739

## 2017-07-15 NOTE — ED TRIAGE NOTES
Patient comes in today with complaint of rash around her hairline and on her scalp.  She dyed her hair prior to getting the rash.

## 2017-08-05 ENCOUNTER — HOSPITAL ENCOUNTER (EMERGENCY)
Facility: HOSPITAL | Age: 42
Discharge: HOME | End: 2017-08-05
Attending: EMERGENCY MEDICINE
Payer: MEDICAID

## 2017-08-05 VITALS
WEIGHT: 168 LBS | SYSTOLIC BLOOD PRESSURE: 122 MMHG | BODY MASS INDEX: 24.11 KG/M2 | TEMPERATURE: 99 F | RESPIRATION RATE: 16 BRPM | DIASTOLIC BLOOD PRESSURE: 82 MMHG | HEART RATE: 82 BPM

## 2017-08-05 DIAGNOSIS — S02.5XXA CLOSED FRACTURE OF TOOTH, INITIAL ENCOUNTER: Primary | ICD-10-CM

## 2017-08-05 PROCEDURE — 25000003 PHARM REV CODE 250: Performed by: EMERGENCY MEDICINE

## 2017-08-05 PROCEDURE — 99283 EMERGENCY DEPT VISIT LOW MDM: CPT

## 2017-08-05 RX ORDER — KETOROLAC TROMETHAMINE 10 MG/1
10 TABLET, FILM COATED ORAL
Status: COMPLETED | OUTPATIENT
Start: 2017-08-05 | End: 2017-08-05

## 2017-08-05 RX ORDER — TRAMADOL HYDROCHLORIDE 50 MG/1
50 TABLET ORAL EVERY 6 HOURS PRN
Qty: 12 TABLET | Refills: 0 | Status: SHIPPED | OUTPATIENT
Start: 2017-08-05 | End: 2017-08-15

## 2017-08-05 RX ADMIN — KETOROLAC TROMETHAMINE 10 MG: 10 TABLET, FILM COATED ORAL at 07:08

## 2017-08-05 NOTE — ED TRIAGE NOTES
C/O toothache for 4 days.  States tried to refill her tramodol, but was told she had to see a doctor.

## 2017-08-06 NOTE — DISCHARGE INSTRUCTIONS
Return if worsening pain, swelling, fever, discharge or other concerns.  Follow up with dentist this week.

## 2017-08-06 NOTE — ED PROVIDER NOTES
Encounter Date: 8/5/2017       History     Chief Complaint   Patient presents with    Dental Pain     42-year-old female presents with dental pain for the past few weeks.  She reports that she was seen here in June 30 for same.  She got Ultram at that time and her pain resolved.  For the past 4 days her pain has recurred.  She denies swelling of her face, discharge from her tooth or fever.  She tried to go to the pharmacist to get a refill on the Ultram she had that they denied her the refill.  He said that she needed to see the doctor before they could refill this prescription.          Review of patient's allergies indicates:  No Known Allergies  Past Medical History:   Diagnosis Date    Chlamydia     Dx'ed around 16-17    Depression      Past Surgical History:   Procedure Laterality Date    DILATION AND CURETTAGE OF UTERUS      hysterosalpingogram      Done at age 26 which demonstrated blockage of both tubes    PELVIC LAPAROSCOPY       Family History   Problem Relation Age of Onset    Thyroid disease Mother     No Known Problems Father     Breast cancer Neg Hx     Colon cancer Neg Hx     Ovarian cancer Neg Hx      Social History   Substance Use Topics    Smoking status: Never Smoker    Smokeless tobacco: Never Used    Alcohol use Yes      Comment: occasionally     Review of Systems   All other systems reviewed and are negative.      Physical Exam     Initial Vitals [08/05/17 1842]   BP Pulse Resp Temp SpO2   133/88 90 16 99 °F (37.2 °C) --      MAP       103         Physical Exam    Nursing note and vitals reviewed.  Constitutional: She appears well-developed and well-nourished. She is not diaphoretic. No distress.   Well-appearing, nontoxic, no trismus, no stridor, no drooling   HENT:   Head: Normocephalic and atraumatic.   Nose: Nose normal.   Mouth/Throat: Oropharynx is clear and moist.   Poor dentition, left lower second molar with fracture, no surrounding edema, erythema or fluctuance.   Eyes:  Conjunctivae and EOM are normal. Pupils are equal, round, and reactive to light. Right eye exhibits no discharge. Left eye exhibits no discharge.   Neck: Normal range of motion. Neck supple.   Cardiovascular: Normal rate, regular rhythm and normal heart sounds.   Pulmonary/Chest: Breath sounds normal. No respiratory distress. She has no wheezes. She has no rhonchi. She has no rales. She exhibits no tenderness.   Abdominal: Soft. Bowel sounds are normal. She exhibits no distension and no mass. There is no tenderness. There is no rebound and no guarding.   Neurological: She is alert and oriented to person, place, and time.   Skin: Skin is warm and dry. Capillary refill takes less than 2 seconds.   Psychiatric: She has a normal mood and affect. Her behavior is normal. Judgment and thought content normal.         ED Course   Procedures  Labs Reviewed - No data to display          Medical Decision Making:   Initial Assessment:   Dental fracture  Differential Diagnosis:   Periapical abscess, dental fracture, Humble's angina                   ED Course     Clinical Impression:   The encounter diagnosis was Closed fracture of tooth, initial encounter.                           Tha Patrick MD  08/05/17 7062

## 2017-08-29 ENCOUNTER — HOSPITAL ENCOUNTER (EMERGENCY)
Facility: HOSPITAL | Age: 42
Discharge: HOME | End: 2017-08-29
Attending: SURGERY
Payer: MEDICAID

## 2017-08-29 VITALS
RESPIRATION RATE: 16 BRPM | TEMPERATURE: 98 F | SYSTOLIC BLOOD PRESSURE: 126 MMHG | BODY MASS INDEX: 24.39 KG/M2 | HEART RATE: 88 BPM | DIASTOLIC BLOOD PRESSURE: 73 MMHG | WEIGHT: 170 LBS

## 2017-08-29 DIAGNOSIS — K04.7 DENTAL ABSCESS: Primary | ICD-10-CM

## 2017-08-29 LAB
ALBUMIN SERPL BCP-MCNC: 3.7 G/DL
ALP SERPL-CCNC: 86 U/L
ALT SERPL W/O P-5'-P-CCNC: 7 U/L
ANION GAP SERPL CALC-SCNC: 11 MMOL/L
AST SERPL-CCNC: 12 U/L
BASOPHILS # BLD AUTO: 0.01 K/UL
BASOPHILS NFR BLD: 0.1 %
BILIRUB SERPL-MCNC: 0.6 MG/DL
BUN SERPL-MCNC: 9 MG/DL
CALCIUM SERPL-MCNC: 9.3 MG/DL
CHLORIDE SERPL-SCNC: 107 MMOL/L
CO2 SERPL-SCNC: 22 MMOL/L
CREAT SERPL-MCNC: 0.8 MG/DL
DIFFERENTIAL METHOD: ABNORMAL
EOSINOPHIL # BLD AUTO: 0 K/UL
EOSINOPHIL NFR BLD: 0.2 %
ERYTHROCYTE [DISTWIDTH] IN BLOOD BY AUTOMATED COUNT: 15.1 %
EST. GFR  (AFRICAN AMERICAN): >60 ML/MIN/1.73 M^2
EST. GFR  (NON AFRICAN AMERICAN): >60 ML/MIN/1.73 M^2
GLUCOSE SERPL-MCNC: 94 MG/DL
HCT VFR BLD AUTO: 35.8 %
HGB BLD-MCNC: 11.5 G/DL
LACTATE SERPL-SCNC: 0.7 MMOL/L
LYMPHOCYTES # BLD AUTO: 1.1 K/UL
LYMPHOCYTES NFR BLD: 7.8 %
MCH RBC QN AUTO: 24.1 PG
MCHC RBC AUTO-ENTMCNC: 32.1 G/DL
MCV RBC AUTO: 75 FL
MONOCYTES # BLD AUTO: 1.5 K/UL
MONOCYTES NFR BLD: 10.8 %
NEUTROPHILS # BLD AUTO: 11.2 K/UL
NEUTROPHILS NFR BLD: 81.1 %
PLATELET # BLD AUTO: 313 K/UL
PMV BLD AUTO: 10.3 FL
POTASSIUM SERPL-SCNC: 3.3 MMOL/L
PROT SERPL-MCNC: 8.2 G/DL
RBC # BLD AUTO: 4.78 M/UL
SODIUM SERPL-SCNC: 140 MMOL/L
WBC # BLD AUTO: 13.76 K/UL

## 2017-08-29 PROCEDURE — 36415 COLL VENOUS BLD VENIPUNCTURE: CPT

## 2017-08-29 PROCEDURE — 99284 EMERGENCY DEPT VISIT MOD MDM: CPT | Mod: 25

## 2017-08-29 PROCEDURE — 96365 THER/PROPH/DIAG IV INF INIT: CPT

## 2017-08-29 PROCEDURE — 87040 BLOOD CULTURE FOR BACTERIA: CPT

## 2017-08-29 PROCEDURE — 85025 COMPLETE CBC W/AUTO DIFF WBC: CPT

## 2017-08-29 PROCEDURE — 63600175 PHARM REV CODE 636 W HCPCS: Performed by: SURGERY

## 2017-08-29 PROCEDURE — 96375 TX/PRO/DX INJ NEW DRUG ADDON: CPT

## 2017-08-29 PROCEDURE — 80053 COMPREHEN METABOLIC PANEL: CPT

## 2017-08-29 PROCEDURE — 83605 ASSAY OF LACTIC ACID: CPT

## 2017-08-29 PROCEDURE — 25000003 PHARM REV CODE 250: Performed by: SURGERY

## 2017-08-29 PROCEDURE — 25500020 PHARM REV CODE 255: Performed by: SURGERY

## 2017-08-29 RX ORDER — SODIUM CHLORIDE 9 MG/ML
1000 INJECTION, SOLUTION INTRAVENOUS
Status: COMPLETED | OUTPATIENT
Start: 2017-08-29 | End: 2017-08-29

## 2017-08-29 RX ORDER — HYDROMORPHONE HYDROCHLORIDE 1 MG/ML
1 INJECTION, SOLUTION INTRAMUSCULAR; INTRAVENOUS; SUBCUTANEOUS
Status: DISCONTINUED | OUTPATIENT
Start: 2017-08-29 | End: 2017-08-29

## 2017-08-29 RX ORDER — ONDANSETRON 2 MG/ML
4 INJECTION INTRAMUSCULAR; INTRAVENOUS
Status: DISCONTINUED | OUTPATIENT
Start: 2017-08-29 | End: 2017-08-29

## 2017-08-29 RX ORDER — HYDROMORPHONE HYDROCHLORIDE 2 MG/ML
2 INJECTION, SOLUTION INTRAMUSCULAR; INTRAVENOUS; SUBCUTANEOUS
Status: COMPLETED | OUTPATIENT
Start: 2017-08-29 | End: 2017-08-29

## 2017-08-29 RX ORDER — ONDANSETRON 2 MG/ML
4 INJECTION INTRAMUSCULAR; INTRAVENOUS
Status: COMPLETED | OUTPATIENT
Start: 2017-08-29 | End: 2017-08-29

## 2017-08-29 RX ADMIN — ONDANSETRON 4 MG: 2 INJECTION INTRAMUSCULAR; INTRAVENOUS at 09:08

## 2017-08-29 RX ADMIN — CEFTRIAXONE 1 G: 1 INJECTION, SOLUTION INTRAVENOUS at 09:08

## 2017-08-29 RX ADMIN — SODIUM CHLORIDE 1000 ML: 0.9 INJECTION, SOLUTION INTRAVENOUS at 08:08

## 2017-08-29 RX ADMIN — IOHEXOL 75 ML: 350 INJECTION, SOLUTION INTRAVENOUS at 09:08

## 2017-08-29 RX ADMIN — HYDROMORPHONE HYDROCHLORIDE 2 MG: 2 INJECTION, SOLUTION INTRAMUSCULAR; INTRAVENOUS; SUBCUTANEOUS at 09:08

## 2017-08-30 NOTE — ED PROVIDER NOTES
[facility]                                        August 29, 2017                   Chief Complaint  42 y.o. female with Dental Pain    History of Present Illness  Destiny Moura presents to the emergency room with left facial swelling tonight  Was seen in the emergency room yesterday and diagnosed with dental pain  Patient on exam has left facial swelling with obvious left molar dental abscess  Patient has no fever or complicating symptoms, 5/10 dental pain this evening  The patient states she cannot afford a dentist, no dentist except her Medicaid    The history is provided by the patient  Medical history: Chlamydia and depression  Surgical history: D&C, hysterosalpingogram, pelvic laparoscopy  No known ALLERGIES  Social history: , employed, nonsmoker, children  Family history: Thyroid disease,    Review of Systems and Physical Exam     Review of Systems  -- Constitution - no fever, denies fatigue, no weakness, no chills  -- Eyes - no tearing or redness, no visual disturbance  -- Ear, Nose - no tinnitus or earache, no nasal congestion or discharge  -- Mouth,Throat - toothache, normal voice, normal swallowing  -- Respiratory - denies cough and congestion, no shortness of breath, no TORRES  -- Cardiovascular - denies chest pain, no palpitations, denies claudication  -- Gastrointestinal - denies abdominal pain, nausea, vomiting, or diarrhea  -- Musculoskeletal - denies back pain, negative for myalgias and arthralgias   -- Neurological - no headache, denies weakness or seizure; no LOC  -- Skin - denies pallor, rash, or changes in skin. no hives or welts noted    Vital Signs  -- Her oral temperature is 98.2 °F (36.8 °C).   -- Her blood pressure is 126/73 and her pulse is 88.   -- Her respiration is 16.      Physical Exam  -- Nursing note and vitals reviewed  -- Constitutional: Appears well-developed and well-nourished  -- Head: Atraumatic. Normocephalic. No obvious abnormality  -- Eyes: Pupils  are equal and reactive to light. Normal conjunctiva and lids  -- Nose: Nose normal in appearance, nares grossly normal. No discharge  -- Throat: Left lower molar cavity with adjacent facial swelling noted  -- Ears: External ears and TM normal bilaterally. Normal hearing and no drainage  -- Neck: Normal range of motion. Neck supple. No masses, trachea midline  -- Cardiac: Normal rate, regular rhythm and normal heart sounds  -- Pulmonary: Normal respiratory effort, breath sounds clear to auscultation  -- Abdominal: Soft, no tenderness. Normal bowel sounds. Normal liver edge  -- Musculoskeletal: Normal range of motion, no effusions. Joints stable   -- Neurological: No focal deficits. Showed good interaction with staff  -- Vascular: Posterior tibial, dorsalis pedis and radial pulses 2+ bilaterally      Emergency Room Course     Treatment and Evaluation  -- CT of the face shows a left lower dental abscess  -- Lactic Acid drawn in the ER today was normal   -- Blood cultures have also been drawn, results are pending     Lab values  --    -- K 3.3 (L)   --    -- CO2 22 (L)   -- BUN 9   -- CREATININE 0.8   -- GLU 94   -- ALKPHOS 86   -- AST 12   -- ALT 7 (L)   -- BILITOT 0.6   -- ALBUMIN 3.7   -- PROT 8.2   -- WBC 13.76 (H)   -- HGB 11.5 (L)   -- HCT 35.8 (L)   --    -- CPK 66   -- CPK 66   -- CPKMB 0.4   -- TROPONINI <0.006   -- INR 1.1   -- BNP 13   -- DDIMER 0.39   -- LACTATE 0.7     Medications Given  -- 0.9%  NaCl infusion (0 mLs Intravenous Stopped 8/29/17 2100)   -- hydromorphone (PF) injection 2 mg (2 mg Intravenous Given 8/29/17 2126)   -- ondansetron injection 4 mg (4 mg Intravenous Given 8/29/17 2126)   -- cefTRIAXone (ROCEPHIN) 1 g in dextrose 5 % 50 mL IVPB    -- omnipaque 350 iohexol 75 mL (75 mLs Intravenous Given 8/29/17 2113)     Medical Decision Making  -- Diagnosis management comments: 42 y.o. female with left facial swelling today  -- Patient was seen in the ER last night, started on  penicillin by Dr. Carter  -- Patient states she had increased left facial swelling today, return to the ER  -- Normal white count, no double lactic acid, CT face: Left dental lower abscess    Diagnosis  -- The encounter diagnosis was Dental abscess.    Disposition and Plan  -- Disposition: to dentist ASAP  -- Condition: stable  -- Pt given instructions; take all medications prescribed in the ER as directed.   -- Patient agrees to comply with all instructions- needs appropriate dental care  -- Pt agrees to return to ER if any symptoms reoccur; symptom-free on discharge  -- Patient to follow up with a dentist in 1-2 days. Has been given follow up information  -- The patient acknowledges that dental follow up is required for this issue     This note is dictated on Dragon Natural Speaking word recognition program.  There are word recognition mistakes that are occasionally missed on review.           Henrik Allen MD  08/30/17 0012

## 2017-09-04 LAB — BACTERIA BLD CULT: NORMAL

## 2019-02-19 ENCOUNTER — HOSPITAL ENCOUNTER (OUTPATIENT)
Dept: RADIOLOGY | Facility: HOSPITAL | Age: 44
Discharge: HOME | End: 2019-02-19
Attending: PHYSICIAN ASSISTANT
Payer: MEDICAID

## 2019-02-19 ENCOUNTER — OFFICE VISIT (OUTPATIENT)
Dept: NEUROSURGERY | Facility: CLINIC | Age: 44
End: 2019-02-19
Payer: MEDICAID

## 2019-02-19 VITALS
DIASTOLIC BLOOD PRESSURE: 80 MMHG | HEIGHT: 70 IN | HEART RATE: 80 BPM | SYSTOLIC BLOOD PRESSURE: 125 MMHG | TEMPERATURE: 98 F | WEIGHT: 165 LBS | BODY MASS INDEX: 23.62 KG/M2

## 2019-02-19 DIAGNOSIS — M54.41 CHRONIC LOW BACK PAIN WITH RIGHT-SIDED SCIATICA, UNSPECIFIED BACK PAIN LATERALITY: ICD-10-CM

## 2019-02-19 DIAGNOSIS — M54.41 CHRONIC LOW BACK PAIN WITH RIGHT-SIDED SCIATICA, UNSPECIFIED BACK PAIN LATERALITY: Primary | ICD-10-CM

## 2019-02-19 DIAGNOSIS — G89.29 CHRONIC LOW BACK PAIN WITH RIGHT-SIDED SCIATICA, UNSPECIFIED BACK PAIN LATERALITY: Primary | ICD-10-CM

## 2019-02-19 DIAGNOSIS — G89.29 CHRONIC LOW BACK PAIN WITH RIGHT-SIDED SCIATICA, UNSPECIFIED BACK PAIN LATERALITY: ICD-10-CM

## 2019-02-19 DIAGNOSIS — R20.2 PARESTHESIA OF RIGHT UPPER AND LOWER EXTREMITY: ICD-10-CM

## 2019-02-19 PROCEDURE — 72120 XR LUMBAR SPINE AP AND LAT WITH FLEX/EXT: ICD-10-PCS | Mod: 26,,, | Performed by: RADIOLOGY

## 2019-02-19 PROCEDURE — 72082 XR SCOLIOSIS COMPLETE: ICD-10-PCS | Mod: 26,,, | Performed by: RADIOLOGY

## 2019-02-19 PROCEDURE — 72050 X-RAY EXAM NECK SPINE 4/5VWS: CPT | Mod: TC

## 2019-02-19 PROCEDURE — 99999 PR PBB SHADOW E&M-EST. PATIENT-LVL IV: CPT | Mod: PBBFAC,,, | Performed by: PHYSICIAN ASSISTANT

## 2019-02-19 PROCEDURE — 72050 XR CERVICAL SPINE AP LAT WITH FLEX EXTEN: ICD-10-PCS | Mod: 26,59,, | Performed by: RADIOLOGY

## 2019-02-19 PROCEDURE — 72050 X-RAY EXAM NECK SPINE 4/5VWS: CPT | Mod: 26,59,, | Performed by: RADIOLOGY

## 2019-02-19 PROCEDURE — 59 XR LUMBAR SPINE AP AND LAT WITH FLEX/EXT: Mod: TC

## 2019-02-19 PROCEDURE — 99999 PR PBB SHADOW E&M-EST. PATIENT-LVL IV: ICD-10-PCS | Mod: PBBFAC,,, | Performed by: PHYSICIAN ASSISTANT

## 2019-02-19 PROCEDURE — 72100 XR LUMBAR SPINE AP AND LAT WITH FLEX/EXT: ICD-10-PCS | Mod: 26,,, | Performed by: RADIOLOGY

## 2019-02-19 PROCEDURE — 72120 X-RAY BEND ONLY L-S SPINE: CPT | Mod: 26,,, | Performed by: RADIOLOGY

## 2019-02-19 PROCEDURE — 99214 OFFICE O/P EST MOD 30 MIN: CPT | Mod: PBBFAC,25 | Performed by: PHYSICIAN ASSISTANT

## 2019-02-19 PROCEDURE — 72100 X-RAY EXAM L-S SPINE 2/3 VWS: CPT | Mod: TC,59

## 2019-02-19 PROCEDURE — 72082 X-RAY EXAM ENTIRE SPI 2/3 VW: CPT | Mod: 26,,, | Performed by: RADIOLOGY

## 2019-02-19 PROCEDURE — 72082 X-RAY EXAM ENTIRE SPI 2/3 VW: CPT | Mod: TC

## 2019-02-19 PROCEDURE — 99203 PR OFFICE/OUTPT VISIT, NEW, LEVL III, 30-44 MIN: ICD-10-PCS | Mod: S$PBB,,, | Performed by: PHYSICIAN ASSISTANT

## 2019-02-19 PROCEDURE — 72100 X-RAY EXAM L-S SPINE 2/3 VWS: CPT | Mod: 26,,, | Performed by: RADIOLOGY

## 2019-02-19 PROCEDURE — 99203 OFFICE O/P NEW LOW 30 MIN: CPT | Mod: S$PBB,,, | Performed by: PHYSICIAN ASSISTANT

## 2019-02-19 PROCEDURE — 59 XR CERVICAL SPINE AP LAT WITH FLEX EXTEN: Mod: TC

## 2019-02-19 PROCEDURE — 72120 X-RAY BEND ONLY L-S SPINE: CPT | Mod: TC

## 2019-02-19 RX ORDER — METHOCARBAMOL 750 MG/1
TABLET, FILM COATED ORAL
Status: ON HOLD | COMMUNITY
Start: 2019-01-09 | End: 2019-06-20 | Stop reason: HOSPADM

## 2019-02-19 RX ORDER — CYPROHEPTADINE HYDROCHLORIDE 4 MG/1
TABLET ORAL
Status: ON HOLD | COMMUNITY
Start: 2019-01-08 | End: 2019-06-20 | Stop reason: HOSPADM
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
## 2019-03-06 ENCOUNTER — TELEPHONE (OUTPATIENT)
Dept: NEUROSURGERY | Facility: CLINIC | Age: 44
End: 2019-03-06

## 2019-03-06 NOTE — TELEPHONE ENCOUNTER
Pt scheduled for MRIs on 03.22.2019.  Pt will get printouts when she comes for her MRIs.  Will F/U w/NALDO Khane about PM and PT referrals.  V/U. ----- Message from Joseline Viveros sent at 3/6/2019  8:11 AM CST -----  Contact: pt @ [phone]  Calling to speak with someone in Dr. Marin's office regarding her getting a copy of her MRI. Please call.

## 2019-03-15 ENCOUNTER — TELEPHONE (OUTPATIENT)
Dept: NEUROSURGERY | Facility: CLINIC | Age: 44
End: 2019-03-15

## 2019-03-15 NOTE — TELEPHONE ENCOUNTER
Pt states she will investigate PT facilities in the [place] area and reach out to us with their info so we can fax over the PT orders, demo, and note.  V/U.----- Message from Shena Hayward sent at 3/15/2019 11:32 AM CDT -----  Contact: Self/ [phone]  Patient would like a call back to speak with a nurse to ask a few questions.

## 2019-03-22 ENCOUNTER — HOSPITAL ENCOUNTER (OUTPATIENT)
Dept: RADIOLOGY | Facility: HOSPITAL | Age: 44
Discharge: HOME | End: 2019-03-22
Attending: PHYSICIAN ASSISTANT
Payer: MEDICAID

## 2019-03-22 DIAGNOSIS — M54.41 CHRONIC LOW BACK PAIN WITH RIGHT-SIDED SCIATICA, UNSPECIFIED BACK PAIN LATERALITY: Primary | ICD-10-CM

## 2019-03-22 DIAGNOSIS — G89.29 CHRONIC LOW BACK PAIN WITH RIGHT-SIDED SCIATICA, UNSPECIFIED BACK PAIN LATERALITY: ICD-10-CM

## 2019-03-22 DIAGNOSIS — M54.41 CHRONIC LOW BACK PAIN WITH RIGHT-SIDED SCIATICA, UNSPECIFIED BACK PAIN LATERALITY: ICD-10-CM

## 2019-03-22 DIAGNOSIS — M54.50 LOW BACK PAIN ASSOCIATED WITH A SPINAL DISORDER OTHER THAN RADICULOPATHY OR SPINAL STENOSIS: Primary | ICD-10-CM

## 2019-03-22 DIAGNOSIS — R20.2 PARESTHESIA OF RIGHT UPPER AND LOWER EXTREMITY: ICD-10-CM

## 2019-03-22 DIAGNOSIS — G89.29 CHRONIC LOW BACK PAIN WITH RIGHT-SIDED SCIATICA, UNSPECIFIED BACK PAIN LATERALITY: Primary | ICD-10-CM

## 2019-03-22 PROCEDURE — 72141 MRI NECK SPINE W/O DYE: CPT | Mod: TC

## 2019-03-22 PROCEDURE — 72148 MRI LUMBAR SPINE W/O DYE: CPT | Mod: 26,,, | Performed by: RADIOLOGY

## 2019-03-22 PROCEDURE — 72141 MRI NECK SPINE W/O DYE: CPT | Mod: 26,,, | Performed by: RADIOLOGY

## 2019-03-22 PROCEDURE — 72148 MRI LUMBAR SPINE WITHOUT CONTRAST: ICD-10-PCS | Mod: 26,,, | Performed by: RADIOLOGY

## 2019-03-22 PROCEDURE — 72141 MRI CERVICAL SPINE WITHOUT CONTRAST: ICD-10-PCS | Mod: 26,,, | Performed by: RADIOLOGY

## 2019-03-22 PROCEDURE — 72148 MRI LUMBAR SPINE W/O DYE: CPT | Mod: TC

## 2019-03-26 ENCOUNTER — TELEPHONE (OUTPATIENT)
Dept: NEUROSURGERY | Facility: CLINIC | Age: 44
End: 2019-03-26

## 2019-03-26 NOTE — PROGRESS NOTES
Physical Therapy Initial Evaluation     Name: Destiny Moura  [facility] Number: [id number]    Diagnosis:   Encounter Diagnosis   Name Primary?    Chronic low back pain with right-sided sciatica, unspecified back pain laterality Yes     Physician: Kaylin Ortiz PA-C  Treatment Orders: PT Eval and Treat  Past Medical History:   Diagnosis Date    Chlamydia     Dx'ed around [age]    Depression      Current Outpatient Medications   Medication Sig    cyproheptadine (PERIACTIN) 4 mg tablet     diphenhydrAMINE (BENADRYL) 50 MG capsule Take 1 capsule (50 mg total) by mouth every 6 (six) hours as needed for Itching.    meloxicam (MOBIC) 15 MG tablet Take 15 mg by mouth once daily.    methocarbamol (ROBAXIN) 750 MG Tab     methylPREDNISolone (MEDROL DOSEPACK) 4 mg tablet As directed    quetiapine (SEROQUEL) 25 MG Tab Take by mouth.     No current facility-administered medications for this visit.      Review of patient's allergies indicates:  No Known Allergies    Time In: 1:15 (pt arrived late)  Time Out: 2:00    Evaluation Date: 3/27/19  Visit # authorized: 1/1  Authorization period: 3/21/2020  Plan of care Expiration: 5/24/19  MD referral: M54.41,G89.29 (ICD-10-CM) - Chronic low back pain with right-sided sciatica, unspecified back pain laterality    Subjective     Patient reports that her low back and L side hurts.  Symptoms have been present since August 2018.  Pt reports having scoliosis in lower spine.  Since turning 41 years old, pain has gotten worse.  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  Pt also reports neck pain.  Extreme pain in neck when it gets cold.  No previous surgeries.  Burning in L buttocks.  R side shoots to foot.  Current pain medication does not help.  Problems c/ sleeping.  Pt reports that she does not walk a lot because she begins to have chest pain.    Diagnostic Imaging: MRI lumbar spine on 3/22/19  Impression        Multilevel facet arthropathy, with active degenerative change noted at the right L3-4 facet.  No significant neural foraminal narrowing or central canal stenosis.         Pain Scale: Destiny rates pain on a scale of 0-10 to be 10 at worst; 9 currently; 9 at best .  Aggravating factors:  Prolonged positioning  Easing factors:  nothing  Prior Therapy: none  Functional Deficits Leading to Referral: pain and limitations with functional mobility  Prior functional status: Independent                    Pts goals:  Return to work, decrease pain, and playing basketball.    Objective     Posture Alignment: increased lordosis    Palpation: TTP along paraspinals and cervical musculature    LUMBAR SPINE AROM:   Flexion: 50%   Extension: 50%   Left Sidebend: 75%   Right Sidebend: 75%   Left Rotation: 75%   Right Rotation: 75%     LOWER EXTREMITY STRENGTH:   Left Right   Quadriceps 4/5 4/5   Hamstrings 4/5 4/5     Iliopsoas 4-/5 4/5   PGM 4-/5 4-/5   Hip IR 4-/5 4-/5   Hip ER 4-/5 4-/5   Hip Ext 4-/5 4-/5     Dermatomes: Sensation: Light Touch: Intact    FLEXIBILITY:decrease B hamstring length    GAIT: Destiny displays antalgic gait, decreased step length, and B trendelenburg    Pt/family was provided educational information, including: role of PT, goals for PT, scheduling - pt verbalized understanding. Discussed insurance limitations with pt.     TREATMENT     PT Evaluation Completed? Yes  Discussed Plan of Care with patient: Yes        Destiny received 8 minutes of manual therapy including:    MET for pelvic re-alignment (L innominate rotated forward)     Written Home Exercises Provided: MET for pelvic re-alignment (see handout)   Destiny demo good understanding of the education provided. Patient demo good return demo of skill of exercises.    Assessment     History  Co-morbidities and personal factors that may impact the plan of care Examination  Body Structures and Functions, activity limitations and participation  restrictions that may impact the plan of care    Clinical Presentation   Co-morbidities:   anxiety and depression        Personal Factors:   no deficits Body Regions:   neck  back  lower extremities    Body Systems:    ROM  strength  gait            Participation Restrictions:   Work, basketball     Activity limitations:   Mobility  lifting and carrying objects  walking    Self care  dressing    Domestic Life  doing house work (cleaning house, washing dishes, laundry)  assisting others             stable and uncomplicated                      low   low  low Decision Making/ Complexity Score:  low     Patient is a 43 year old female that presents to outpatient PT c/ a medical diagnosis of low back c/ R sided sciatica.  Pt also demonstrates L sided LE pain and neck pain.  Pelvis not aligned at beginning of session, this was corrected c/ MET and PT educated pt on how to properly perform this MET for HEP. Pt demonstrates limitations in lumbar ROM, BLE strength, and gait deviations are noted above.  Pt would like to return to working and playing basketball.  Pt prognosis is Good.  Pt will benefit from skilled outpatient physical therapy to address the above stated deficits, provide pt/family education and to maximize pt's level of independence.     Medical necessity is demonstrated by the following IMPAIRMENTS/PROBLEMS:  1. Increased Pain  2. Decreased Segmental Mobility & Decreased ROM  3. Decreased Core & BLE strength  4. Decreased Flexibility BLE  5. Decreased Tolerance to Functional Activities    Pt's spiritual, cultural and educational needs considered and pt agreeable to plan of care and goals as stated below:     Anticipated Barriers for physical therapy: transportation    Short Term GOALS: 4 weeks. Pt agrees with goals set.  1. Patient demonstrates independence with HEP.   2. Patient demonstrates independence with Postural Awareness.   3. Patient demonstrates independence with body mechanics.     Long Term GOALS:  8 weeks. Pt agrees with goals set.  1. Patient demonstrates increased lumbar ROM by 25% in all planes to improve tolerance to functional activities.   2. Patient demonstrates increased strength BLE's to 4+/5 or greater to improve tolerance to functional activities.   3. Patient demonstrates improved overall function per FOTO Lumbar Survey to 49% or less.     Functional Limitations Reports - G Codes  Category: Mobility  Tool: FOTO Lumbar Survey  Score: 58% Limitation      PLAN     Outpatient physical therapy 2 times weekly to include: pt ed, hep, therapeutic exercises, neuromuscular re-education/ balance exercises, joint mobilizations, aquatic therapy and modalities prn. Cont PT for  8 weeks. Pt may be seen by PTA as part of the rehabilitation team.     Therapist: Henrik Saxena, PT,DPT  3/27/2019

## 2019-03-27 ENCOUNTER — CLINICAL SUPPORT (OUTPATIENT)
Dept: REHABILITATION | Facility: HOSPITAL | Age: 44
End: 2019-03-27
Payer: MEDICAID

## 2019-03-27 DIAGNOSIS — M54.41 CHRONIC LOW BACK PAIN WITH RIGHT-SIDED SCIATICA, UNSPECIFIED BACK PAIN LATERALITY: Primary | ICD-10-CM

## 2019-03-27 DIAGNOSIS — G89.29 CHRONIC LOW BACK PAIN WITH RIGHT-SIDED SCIATICA, UNSPECIFIED BACK PAIN LATERALITY: Primary | ICD-10-CM

## 2019-03-27 PROCEDURE — 97161 PT EVAL LOW COMPLEX 20 MIN: CPT

## 2019-03-27 PROCEDURE — 97140 MANUAL THERAPY 1/> REGIONS: CPT

## 2019-03-27 NOTE — PLAN OF CARE
Physical Therapy Initial Evaluation     Name: Destiny Moura  [facility] Number: [id number]    Diagnosis:   Encounter Diagnosis   Name Primary?    Chronic low back pain with right-sided sciatica, unspecified back pain laterality Yes     Physician: Kaylin Ortiz PA-C  Treatment Orders: PT Eval and Treat  Past Medical History:   Diagnosis Date    Chlamydia     Dx'ed around 17-17    Depression      Current Outpatient Medications   Medication Sig    cyproheptadine (PERIACTIN) 4 mg tablet     diphenhydrAMINE (BENADRYL) 50 MG capsule Take 1 capsule (50 mg total) by mouth every 6 (six) hours as needed for Itching.    meloxicam (MOBIC) 15 MG tablet Take 15 mg by mouth once daily.    methocarbamol (ROBAXIN) 750 MG Tab     methylPREDNISolone (MEDROL DOSEPACK) 4 mg tablet As directed    quetiapine (SEROQUEL) 25 MG Tab Take by mouth.     No current facility-administered medications for this visit.      Review of patient's allergies indicates:  No Known Allergies    Time In: 1:15 (pt arrived late)  Time Out: 2:00    Evaluation Date: 3/27/19  Visit # authorized: 1/1  Authorization period: 3/21/2020  Plan of care Expiration: 5/24/19  MD referral: M54.41,G89.29 (ICD-10-CM) - Chronic low back pain with right-sided sciatica, unspecified back pain laterality    Subjective     Patient reports that her low back and L side hurts.  Symptoms have been present since August 2018.  Pt reports having scoliosis in lower spine.  Since turning 41 years old, pain has gotten worse.  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  Pt also reports neck pain.  Extreme pain in neck when it gets cold.  No previous surgeries.  Burning in L buttocks.  R side shoots to foot.  Current pain medication does not help.  Problems c/ sleeping.  Pt reports that she does not walk a lot because she begins to have chest pain.    Diagnostic Imaging: MRI lumbar spine on 3/22/19  Impression        Multilevel facet arthropathy, with active degenerative change noted at the right L3-4 facet.  No significant neural foraminal narrowing or central canal stenosis.         Pain Scale: Destiny rates pain on a scale of 0-10 to be 10 at worst; 9 currently; 9 at best .  Aggravating factors:  Prolonged positioning  Easing factors:  nothing  Prior Therapy: none  Functional Deficits Leading to Referral: pain and limitations with functional mobility  Prior functional status: Independent                    Pts goals:  Return to work, decrease pain, and playing basketball.    Objective     Posture Alignment: increased lordosis    Palpation: TTP along paraspinals and cervical musculature    LUMBAR SPINE AROM:   Flexion: 50%   Extension: 50%   Left Sidebend: 75%   Right Sidebend: 75%   Left Rotation: 75%   Right Rotation: 75%     LOWER EXTREMITY STRENGTH:   Left Right   Quadriceps 4/5 4/5   Hamstrings 4/5 4/5     Iliopsoas 4-/5 4/5   PGM 4-/5 4-/5   Hip IR 4-/5 4-/5   Hip ER 4-/5 4-/5   Hip Ext 4-/5 4-/5     Dermatomes: Sensation: Light Touch: Intact    FLEXIBILITY:decrease B hamstring length    GAIT: Destiny displays antalgic gait, decreased step length, and B trendelenburg    Pt/family was provided educational information, including: role of PT, goals for PT, scheduling - pt verbalized understanding. Discussed insurance limitations with pt.     TREATMENT     PT Evaluation Completed? Yes  Discussed Plan of Care with patient: Yes        Destiny received 8 minutes of manual therapy including:    MET for pelvic re-alignment (L innominate rotated forward)     Written Home Exercises Provided: MET for pelvic re-alignment (see handout)   Destiny demo good understanding of the education provided. Patient demo good return demo of skill of exercises.    Assessment     History  Co-morbidities and personal factors that may impact the plan of care Examination  Body Structures and Functions, activity limitations and participation  restrictions that may impact the plan of care    Clinical Presentation   Co-morbidities:   anxiety and depression        Personal Factors:   no deficits Body Regions:   neck  back  lower extremities    Body Systems:    ROM  strength  gait            Participation Restrictions:   Work, basketball     Activity limitations:   Mobility  lifting and carrying objects  walking    Self care  dressing    Domestic Life  doing house work (cleaning house, washing dishes, laundry)  assisting others             stable and uncomplicated                      low   low  low Decision Making/ Complexity Score:  low     Patient is a 43 year old female that presents to outpatient PT c/ a medical diagnosis of low back c/ R sided sciatica.  Pt also demonstrates L sided LE pain and neck pain.  Pelvis not aligned at beginning of session, this was corrected c/ MET and PT educated pt on how to properly perform this MET for HEP. Pt demonstrates limitations in lumbar ROM, BLE strength, and gait deviations are noted above.  Pt would like to return to working and playing basketball.  Pt prognosis is Good.  Pt will benefit from skilled outpatient physical therapy to address the above stated deficits, provide pt/family education and to maximize pt's level of independence.     Medical necessity is demonstrated by the following IMPAIRMENTS/PROBLEMS:  1. Increased Pain  2. Decreased Segmental Mobility & Decreased ROM  3. Decreased Core & BLE strength  4. Decreased Flexibility BLE  5. Decreased Tolerance to Functional Activities    Pt's spiritual, cultural and educational needs considered and pt agreeable to plan of care and goals as stated below:     Anticipated Barriers for physical therapy: transportation    Short Term GOALS: 4 weeks. Pt agrees with goals set.  1. Patient demonstrates independence with HEP.   2. Patient demonstrates independence with Postural Awareness.   3. Patient demonstrates independence with body mechanics.     Long Term GOALS:  8 weeks. Pt agrees with goals set.  1. Patient demonstrates increased lumbar ROM by 25% in all planes to improve tolerance to functional activities.   2. Patient demonstrates increased strength BLE's to 4+/5 or greater to improve tolerance to functional activities.   3. Patient demonstrates improved overall function per FOTO Lumbar Survey to 49% or less.     Functional Limitations Reports - G Codes  Category: Mobility  Tool: FOTO Lumbar Survey  Score: 58% Limitation      PLAN     Outpatient physical therapy 2 times weekly to include: pt ed, hep, therapeutic exercises, neuromuscular re-education/ balance exercises, joint mobilizations, aquatic therapy and modalities prn. Cont PT for  8 weeks. Pt may be seen by PTA as part of the rehabilitation team.     Therapist: Henrik Saxena, PT,DPT  3/27/2019

## 2019-03-28 ENCOUNTER — TELEPHONE (OUTPATIENT)
Dept: NEUROSURGERY | Facility: CLINIC | Age: 44
End: 2019-03-28

## 2019-03-28 NOTE — TELEPHONE ENCOUNTER
Left VM for pt explaining that I have learned that [facility] do not accept Medicaid[employer].  Advised pt to call the number on the back of her card to investigate what services she is eligible for.  ----- Message from Geremias Barrios sent at 3/28/2019  8:38 AM CDT -----  Needs Advice    Reason for call: Pt is asking to speak w/ May about referral to pain mgmt        Communication Preference: [phone]    Additional Information:

## 2019-04-02 ENCOUNTER — HOSPITAL ENCOUNTER (EMERGENCY)
Facility: HOSPITAL | Age: 44
Discharge: HOME | End: 2019-04-02
Attending: SURGERY
Payer: MEDICAID

## 2019-04-02 VITALS
WEIGHT: 166.13 LBS | RESPIRATION RATE: 16 BRPM | HEART RATE: 88 BPM | DIASTOLIC BLOOD PRESSURE: 73 MMHG | TEMPERATURE: 98 F | OXYGEN SATURATION: 98 % | SYSTOLIC BLOOD PRESSURE: 126 MMHG | BODY MASS INDEX: 23.84 KG/M2

## 2019-04-02 DIAGNOSIS — R00.2 PALPITATIONS: ICD-10-CM

## 2019-04-02 DIAGNOSIS — R55 SYNCOPE, UNSPECIFIED SYNCOPE TYPE: Primary | ICD-10-CM

## 2019-04-02 LAB
ALBUMIN SERPL BCP-MCNC: 4.2 G/DL (ref 3.5–5.2)
ALP SERPL-CCNC: 86 U/L (ref 55–135)
ALT SERPL W/O P-5'-P-CCNC: 5 U/L (ref 10–44)
AMPHET+METHAMPHET UR QL: NEGATIVE
ANION GAP SERPL CALC-SCNC: 11 MMOL/L (ref 8–16)
APTT BLDCRRT: 34.4 SEC (ref 21–32)
AST SERPL-CCNC: 10 U/L (ref 10–40)
B-HCG UR QL: NEGATIVE
BACTERIA #/AREA URNS HPF: NORMAL /HPF
BARBITURATES UR QL SCN>200 NG/ML: NEGATIVE
BASOPHILS # BLD AUTO: 0.01 K/UL (ref 0–0.2)
BASOPHILS NFR BLD: 0.2 % (ref 0–1.9)
BENZODIAZ UR QL SCN>200 NG/ML: NEGATIVE
BILIRUB SERPL-MCNC: 0.4 MG/DL (ref 0.1–1)
BILIRUB UR QL STRIP: ABNORMAL
BUN SERPL-MCNC: 7 MG/DL (ref 6–20)
BZE UR QL SCN: NEGATIVE
CALCIUM SERPL-MCNC: 10 MG/DL (ref 8.7–10.5)
CANNABINOIDS UR QL SCN: ABNORMAL
CHLORIDE SERPL-SCNC: 107 MMOL/L (ref 95–110)
CK MB SERPL-MCNC: 0.5 NG/ML (ref 0.1–6.5)
CK MB SERPL-RTO: 0.7 % (ref 0–5)
CK SERPL-CCNC: 68 U/L (ref 20–180)
CK SERPL-CCNC: 68 U/L (ref 20–180)
CLARITY UR: CLEAR
CO2 SERPL-SCNC: 23 MMOL/L (ref 23–29)
COLOR UR: YELLOW
CREAT SERPL-MCNC: 0.9 MG/DL (ref 0.5–1.4)
CREAT UR-MCNC: 388.8 MG/DL (ref 15–325)
DIFFERENTIAL METHOD: ABNORMAL
EOSINOPHIL # BLD AUTO: 0 K/UL (ref 0–0.5)
EOSINOPHIL NFR BLD: 0.2 % (ref 0–8)
ERYTHROCYTE [DISTWIDTH] IN BLOOD BY AUTOMATED COUNT: 15 % (ref 11.5–14.5)
EST. GFR  (AFRICAN AMERICAN): >60 ML/MIN/1.73 M^2
EST. GFR  (NON AFRICAN AMERICAN): >60 ML/MIN/1.73 M^2
GLUCOSE SERPL-MCNC: 82 MG/DL (ref 70–110)
GLUCOSE UR QL STRIP: NEGATIVE
HCT VFR BLD AUTO: 42.4 % (ref 37–48.5)
HGB BLD-MCNC: 13.4 G/DL (ref 12–16)
HGB UR QL STRIP: NEGATIVE
HYALINE CASTS #/AREA URNS LPF: 0 /LPF
INR PPP: 1.1 (ref 0.8–1.2)
KETONES UR QL STRIP: ABNORMAL
LEUKOCYTE ESTERASE UR QL STRIP: NEGATIVE
LYMPHOCYTES # BLD AUTO: 1.2 K/UL (ref 1–4.8)
LYMPHOCYTES NFR BLD: 30 % (ref 18–48)
MCH RBC QN AUTO: 23.6 PG (ref 27–31)
MCHC RBC AUTO-ENTMCNC: 31.6 G/DL (ref 32–36)
MCV RBC AUTO: 75 FL (ref 82–98)
METHADONE UR QL SCN>300 NG/ML: NEGATIVE
MICROSCOPIC COMMENT: NORMAL
MONOCYTES # BLD AUTO: 0.5 K/UL (ref 0.3–1)
MONOCYTES NFR BLD: 13.1 % (ref 4–15)
NEUTROPHILS # BLD AUTO: 2.3 K/UL (ref 1.8–7.7)
NEUTROPHILS NFR BLD: 56.5 % (ref 38–73)
NITRITE UR QL STRIP: NEGATIVE
OPIATES UR QL SCN: NEGATIVE
PCP UR QL SCN>25 NG/ML: NEGATIVE
PH UR STRIP: 6 [PH] (ref 5–8)
PLATELET # BLD AUTO: 320 K/UL (ref 150–350)
PMV BLD AUTO: 10.5 FL (ref 9.2–12.9)
POTASSIUM SERPL-SCNC: 3.3 MMOL/L (ref 3.5–5.1)
PROT SERPL-MCNC: 8.6 G/DL (ref 6–8.4)
PROT UR QL STRIP: ABNORMAL
PROTHROMBIN TIME: 11.6 SEC (ref 9–12.5)
RBC # BLD AUTO: 5.67 M/UL (ref 4–5.4)
RBC #/AREA URNS HPF: 0 /HPF (ref 0–4)
SODIUM SERPL-SCNC: 141 MMOL/L (ref 136–145)
SP GR UR STRIP: 1.02 (ref 1–1.03)
TOXICOLOGY INFORMATION: ABNORMAL
TROPONIN I SERPL DL<=0.01 NG/ML-MCNC: <0.006 NG/ML (ref 0–0.03)
URN SPEC COLLECT METH UR: ABNORMAL
UROBILINOGEN UR STRIP-ACNC: 1 EU/DL
WBC # BLD AUTO: 4.13 K/UL (ref 3.9–12.7)
WBC #/AREA URNS HPF: 4 /HPF (ref 0–5)

## 2019-04-02 PROCEDURE — 85025 COMPLETE CBC W/AUTO DIFF WBC: CPT

## 2019-04-02 PROCEDURE — 93010 EKG 12-LEAD: ICD-10-PCS | Mod: ,,, | Performed by: INTERNAL MEDICINE

## 2019-04-02 PROCEDURE — 82553 CREATINE MB FRACTION: CPT

## 2019-04-02 PROCEDURE — 80307 DRUG TEST PRSMV CHEM ANLYZR: CPT

## 2019-04-02 PROCEDURE — 36415 COLL VENOUS BLD VENIPUNCTURE: CPT

## 2019-04-02 PROCEDURE — 25000003 PHARM REV CODE 250: Performed by: EMERGENCY MEDICINE

## 2019-04-02 PROCEDURE — 81000 URINALYSIS NONAUTO W/SCOPE: CPT | Mod: 59

## 2019-04-02 PROCEDURE — 84484 ASSAY OF TROPONIN QUANT: CPT

## 2019-04-02 PROCEDURE — 93005 ELECTROCARDIOGRAM TRACING: CPT

## 2019-04-02 PROCEDURE — 82550 ASSAY OF CK (CPK): CPT

## 2019-04-02 PROCEDURE — 90471 IMMUNIZATION ADMIN: CPT | Performed by: EMERGENCY MEDICINE

## 2019-04-02 PROCEDURE — 99284 EMERGENCY DEPT VISIT MOD MDM: CPT | Mod: 25

## 2019-04-02 PROCEDURE — 63600175 PHARM REV CODE 636 W HCPCS: Performed by: EMERGENCY MEDICINE

## 2019-04-02 PROCEDURE — 93010 ELECTROCARDIOGRAM REPORT: CPT | Mod: ,,, | Performed by: INTERNAL MEDICINE

## 2019-04-02 PROCEDURE — 85610 PROTHROMBIN TIME: CPT

## 2019-04-02 PROCEDURE — 81025 URINE PREGNANCY TEST: CPT

## 2019-04-02 PROCEDURE — 90715 TDAP VACCINE 7 YRS/> IM: CPT | Performed by: EMERGENCY MEDICINE

## 2019-04-02 PROCEDURE — 85730 THROMBOPLASTIN TIME PARTIAL: CPT

## 2019-04-02 PROCEDURE — 80053 COMPREHEN METABOLIC PANEL: CPT

## 2019-04-02 PROCEDURE — 96360 HYDRATION IV INFUSION INIT: CPT

## 2019-04-02 RX ORDER — TRAMADOL HYDROCHLORIDE 50 MG/1
50 TABLET ORAL
Status: COMPLETED | OUTPATIENT
Start: 2019-04-02 | End: 2019-04-02

## 2019-04-02 RX ORDER — TRAMADOL HYDROCHLORIDE 50 MG/1
50 TABLET ORAL EVERY 6 HOURS PRN
Qty: 15 TABLET | Refills: 0 | Status: SHIPPED | OUTPATIENT
Start: 2019-04-02 | End: 2019-04-12

## 2019-04-02 RX ORDER — CYCLOBENZAPRINE HCL 10 MG
10 TABLET ORAL 3 TIMES DAILY PRN
Qty: 15 TABLET | Refills: 0 | Status: SHIPPED | OUTPATIENT
Start: 2019-04-02 | End: 2019-04-07

## 2019-04-02 RX ORDER — SODIUM CHLORIDE 9 MG/ML
1000 INJECTION, SOLUTION INTRAVENOUS
Status: COMPLETED | OUTPATIENT
Start: 2019-04-02 | End: 2019-04-02

## 2019-04-02 RX ADMIN — TRAMADOL HYDROCHLORIDE 50 MG: 50 TABLET, FILM COATED ORAL at 07:04

## 2019-04-02 RX ADMIN — CLOSTRIDIUM TETANI TOXOID ANTIGEN (FORMALDEHYDE INACTIVATED), CORYNEBACTERIUM DIPHTHERIAE TOXOID ANTIGEN (FORMALDEHYDE INACTIVATED), BORDETELLA PERTUSSIS TOXOID ANTIGEN (GLUTARALDEHYDE INACTIVATED), BORDETELLA PERTUSSIS FILAMENTOUS HEMAGGLUTININ ANTIGEN (FORMALDEHYDE INACTIVATED), BORDETELLA PERTUSSIS PERTACTIN ANTIGEN, AND BORDETELLA PERTUSSIS FIMBRIAE 2/3 ANTIGEN 0.5 ML: 5; 2; 2.5; 5; 3; 5 INJECTION, SUSPENSION INTRAMUSCULAR at 07:04

## 2019-04-02 RX ADMIN — SODIUM CHLORIDE 1000 ML: 0.9 INJECTION, SOLUTION INTRAVENOUS at 06:04

## 2019-04-02 NOTE — ED NOTES
Patient provided a urine specimen cup at bedside with instructions that a clean catch urine sample is needed, patient verbalized understanding. Patient then ambulated to restroom w steady gait. Will continue to monitor.

## 2019-04-02 NOTE — ED TRIAGE NOTES
43 y.o. female presents to ER ED 02/ED 02A   Chief Complaint   Patient presents with    Loss of Consciousness   Pt reports syncopal episode when standing up. Pt fell onto concrete hit her head, c/o pain to bilateral knees, shoulders and neck. No acute distress noted.

## 2019-04-02 NOTE — ED PROVIDER NOTES
Encounter Date: 4/2/2019       History     Chief Complaint   Patient presents with    Loss of Consciousness     Patient stares ''passing out '' before in the past several times for over three years.   States this event occurred yesterday.    The history is provided by the patient.   Neurologic Problem   The primary symptoms include syncope and loss of consciousness. Primary symptoms do not include headaches, altered mental status, seizures, visual change, fever, nausea or vomiting. The symptoms began yesterday. The symptoms are resolved.   There was loss of consciousness. There was no visual change or nausea. The syncopal episode did not occur with palpitations, shortness of breath, lower back pain or headaches. There was no post-event confusion. There was no urinary incontinence with syncope. There is no history of seizures.   Additional symptoms do not include neck stiffness or lower back pain.     Review of patient's allergies indicates:  No Known Allergies  Past Medical History:   Diagnosis Date    Chlamydia     Dx'ed around 16-17    Depression      Past Surgical History:   Procedure Laterality Date    DILATION AND CURETTAGE OF UTERUS      hysterosalpingogram      Done at age 26 which demonstrated blockage of both tubes    PELVIC LAPAROSCOPY       Family History   Problem Relation Age of Onset    Thyroid disease Mother     No Known Problems Father     Breast cancer Neg Hx     Colon cancer Neg Hx     Ovarian cancer Neg Hx      Social History     Tobacco Use    Smoking status: Never Smoker    Smokeless tobacco: Never Used   Substance Use Topics    Alcohol use: Yes     Comment: occasionally    Drug use: No     Review of Systems   Constitutional: Negative for fever.   HENT: Negative for ear pain.    Eyes: Negative for pain and redness.   Respiratory: Negative for cough and shortness of breath.    Cardiovascular: Positive for syncope. Negative for palpitations.   Gastrointestinal: Negative for  abdominal pain, nausea and vomiting.   Genitourinary: Negative for enuresis and flank pain.   Musculoskeletal: Positive for joint swelling. Negative for back pain, gait problem, neck pain and neck stiffness.   Skin: Negative for rash.   Neurological: Positive for loss of consciousness and syncope. Negative for seizures and headaches.       Physical Exam     Initial Vitals [04/02/19 1729]   BP Pulse Resp Temp SpO2   (!) 142/83 101 20 97.8 °F (36.6 °C) 98 %      MAP       --         Physical Exam    Nursing note and vitals reviewed.  Constitutional: She appears well-developed and well-nourished. She is not diaphoretic. No distress.   HENT:   Head: Normocephalic and atraumatic.   Mouth/Throat: No oropharyngeal exudate.   Eyes: EOM are normal. Pupils are equal, round, and reactive to light.   Neck: Normal range of motion. Neck supple. No JVD present.   Pulmonary/Chest: Breath sounds normal. No stridor. No respiratory distress. She has no wheezes. She has no rhonchi. She has no rales.   Abdominal: Soft. Bowel sounds are normal. She exhibits no distension. There is no tenderness. There is no rebound and no guarding.   Musculoskeletal: Normal range of motion. She exhibits tenderness. She exhibits no edema.        Arms:  Neurological: She is alert and oriented to person, place, and time. No sensory deficit.   Skin: No rash noted.         ED Course   Procedures  Labs Reviewed   COMPREHENSIVE METABOLIC PANEL   CBC W/ AUTO DIFFERENTIAL   TROPONIN I   CK   CK-MB   APTT   PROTIME-INR   PREGNANCY TEST, URINE RAPID   DRUG SCREEN PANEL, URINE EMERGENCY   URINALYSIS          Imaging Results    None                               Clinical Impression:       ICD-10-CM ICD-9-CM   1. Syncope, unspecified syncope type R55 780.2   2. Palpitations R00.2 785.1         Disposition:   Disposition: Discharged  Condition: Stable                        Ramierz Gamboa MD  04/02/19 1939

## 2019-04-03 NOTE — ED NOTES
Discharge instructions and rx x2 given, patient and cousin voiced understanding. Discharged to home in stable condition, ambulatory out of ER w steady gait, NAD.

## 2019-06-16 ENCOUNTER — HOSPITAL ENCOUNTER (INPATIENT)
Facility: HOSPITAL | Age: 44
LOS: 4 days | Discharge: HOME | DRG: 885 | End: 2019-06-20
Attending: PSYCHIATRY & NEUROLOGY | Admitting: PSYCHIATRY & NEUROLOGY
Payer: MEDICAID

## 2019-06-16 DIAGNOSIS — R45.851 DEPRESSION WITH SUICIDAL IDEATION: ICD-10-CM

## 2019-06-16 DIAGNOSIS — E87.6 HYPOKALEMIA: ICD-10-CM

## 2019-06-16 DIAGNOSIS — F33.2 SEVERE EPISODE OF RECURRENT MAJOR DEPRESSIVE DISORDER, WITHOUT PSYCHOTIC FEATURES: Primary | ICD-10-CM

## 2019-06-16 DIAGNOSIS — F32.A DEPRESSION WITH SUICIDAL IDEATION: ICD-10-CM

## 2019-06-16 PROCEDURE — 25000003 PHARM REV CODE 250: Performed by: PSYCHIATRY & NEUROLOGY

## 2019-06-16 PROCEDURE — 11400000 HC PSYCH PRIVATE ROOM

## 2019-06-16 PROCEDURE — 80061 LIPID PANEL: CPT

## 2019-06-16 RX ORDER — LOPERAMIDE HYDROCHLORIDE 2 MG/1
2 CAPSULE ORAL
Status: DISCONTINUED | OUTPATIENT
Start: 2019-06-16 | End: 2019-06-20 | Stop reason: HOSPADM

## 2019-06-16 RX ORDER — MAG HYDROX/ALUMINUM HYD/SIMETH 200-200-20
30 SUSPENSION, ORAL (FINAL DOSE FORM) ORAL EVERY 6 HOURS PRN
Status: DISCONTINUED | OUTPATIENT
Start: 2019-06-16 | End: 2019-06-20 | Stop reason: HOSPADM

## 2019-06-16 RX ORDER — FOLIC ACID 1 MG/1
1 TABLET ORAL DAILY
Status: DISCONTINUED | OUTPATIENT
Start: 2019-06-16 | End: 2019-06-20 | Stop reason: HOSPADM

## 2019-06-16 RX ORDER — HYDROXYZINE PAMOATE 50 MG/1
50 CAPSULE ORAL NIGHTLY PRN
Status: DISCONTINUED | OUTPATIENT
Start: 2019-06-16 | End: 2019-06-20 | Stop reason: HOSPADM

## 2019-06-16 RX ORDER — DOCUSATE SODIUM 100 MG/1
100 CAPSULE, LIQUID FILLED ORAL DAILY PRN
Status: DISCONTINUED | OUTPATIENT
Start: 2019-06-16 | End: 2019-06-20 | Stop reason: HOSPADM

## 2019-06-16 RX ORDER — OLANZAPINE 10 MG/1
10 TABLET ORAL EVERY 8 HOURS PRN
Status: DISCONTINUED | OUTPATIENT
Start: 2019-06-16 | End: 2019-06-20 | Stop reason: HOSPADM

## 2019-06-16 RX ORDER — OLANZAPINE 10 MG/2ML
10 INJECTION, POWDER, FOR SOLUTION INTRAMUSCULAR EVERY 8 HOURS PRN
Status: DISCONTINUED | OUTPATIENT
Start: 2019-06-16 | End: 2019-06-20 | Stop reason: HOSPADM

## 2019-06-16 RX ORDER — ACETAMINOPHEN 325 MG/1
650 TABLET ORAL EVERY 6 HOURS PRN
Status: DISCONTINUED | OUTPATIENT
Start: 2019-06-16 | End: 2019-06-20 | Stop reason: HOSPADM

## 2019-06-16 RX ADMIN — THERA TABS 1 TABLET: TAB at 02:06

## 2019-06-16 RX ADMIN — FOLIC ACID 1 MG: 1 TABLET ORAL at 02:06

## 2019-06-16 RX ADMIN — HYDROXYZINE PAMOATE 50 MG: 50 CAPSULE ORAL at 08:06

## 2019-06-16 NOTE — PSYCH
Pt accepted for admission by Dr Blair.Report received from Kendra FUENTES.Pt arrived to unit at 1216pm.Prior to entry on unit pt scanned per security wand.Upon entry on unit pt received a body assessment.Pt PECed in ER for depression with suicidal thoughts with no plan.Pt also with anxiety.Pt reports marital problems and financial problems.Pt reports she is not working and has applied for disability.Pt reports a hx of scoliosis and chronic pain.Pt used to work as a CNA.Pt reports her aunt  last Tuesday.Pt positive for MJ and amph.Pt reports only smokes MJ occasionally and the amph must be from the one time use of a ''X'' pill she took.Pt noted to start sucking her thumb at end of interview after questions concerning drug use.Pt cooperative.Pt educated on rules and program.Pt given a unit tour.

## 2019-06-17 PROBLEM — E87.6 HYPOKALEMIA: Status: ACTIVE | Noted: 2019-06-17

## 2019-06-17 LAB
CHOLEST SERPL-MCNC: 148 MG/DL (ref 120–199)
CHOLEST/HDLC SERPL: 3.8 {RATIO} (ref 2–5)
HDLC SERPL-MCNC: 39 MG/DL (ref 40–75)
HDLC SERPL: 26.4 % (ref 20–50)
LDLC SERPL CALC-MCNC: 100.2 MG/DL (ref 63–159)
NONHDLC SERPL-MCNC: 109 MG/DL
TRIGL SERPL-MCNC: 44 MG/DL (ref 30–150)

## 2019-06-17 PROCEDURE — 36415 COLL VENOUS BLD VENIPUNCTURE: CPT

## 2019-06-17 PROCEDURE — 25000003 PHARM REV CODE 250: Performed by: PSYCHIATRY & NEUROLOGY

## 2019-06-17 PROCEDURE — 99222 1ST HOSP IP/OBS MODERATE 55: CPT | Mod: ,,, | Performed by: INTERNAL MEDICINE

## 2019-06-17 PROCEDURE — 90833 PSYTX W PT W E/M 30 MIN: CPT | Mod: AF,HB,S$PBB, | Performed by: PSYCHIATRY & NEUROLOGY

## 2019-06-17 PROCEDURE — 99221 1ST HOSP IP/OBS SF/LOW 40: CPT | Mod: ,,, | Performed by: NURSE PRACTITIONER

## 2019-06-17 PROCEDURE — 99221 PR INITIAL HOSPITAL CARE,LEVL I: ICD-10-PCS | Mod: ,,, | Performed by: NURSE PRACTITIONER

## 2019-06-17 PROCEDURE — 99223 1ST HOSP IP/OBS HIGH 75: CPT | Mod: AF,HB,S$PBB, | Performed by: PSYCHIATRY & NEUROLOGY

## 2019-06-17 PROCEDURE — 11400000 HC PSYCH PRIVATE ROOM

## 2019-06-17 PROCEDURE — 99223 PR INITIAL HOSPITAL CARE,LEVL III: ICD-10-PCS | Mod: AF,HB,S$PBB, | Performed by: PSYCHIATRY & NEUROLOGY

## 2019-06-17 PROCEDURE — 99222 PR INITIAL HOSPITAL CARE,LEVL II: ICD-10-PCS | Mod: ,,, | Performed by: INTERNAL MEDICINE

## 2019-06-17 PROCEDURE — 90833 PR PSYCHOTHERAPY W/PATIENT W/E&M, 30 MIN (ADD ON): ICD-10-PCS | Mod: AF,HB,S$PBB, | Performed by: PSYCHIATRY & NEUROLOGY

## 2019-06-17 RX ORDER — POTASSIUM CHLORIDE 20 MEQ/15ML
20 SOLUTION ORAL ONCE
Status: COMPLETED | OUTPATIENT
Start: 2019-06-17 | End: 2019-06-17

## 2019-06-17 RX ORDER — MIRTAZAPINE 15 MG/1
15 TABLET, ORALLY DISINTEGRATING ORAL NIGHTLY
Status: DISCONTINUED | OUTPATIENT
Start: 2019-06-17 | End: 2019-06-20 | Stop reason: HOSPADM

## 2019-06-17 RX ORDER — QUETIAPINE FUMARATE 25 MG/1
25 TABLET, FILM COATED ORAL NIGHTLY
Status: DISCONTINUED | OUTPATIENT
Start: 2019-06-17 | End: 2019-06-19

## 2019-06-17 RX ADMIN — POTASSIUM CHLORIDE 20 MEQ: 20 SOLUTION ORAL at 08:06

## 2019-06-17 RX ADMIN — THERA TABS 1 TABLET: TAB at 08:06

## 2019-06-17 RX ADMIN — QUETIAPINE FUMARATE 25 MG: 25 TABLET ORAL at 08:06

## 2019-06-17 RX ADMIN — ACETAMINOPHEN 650 MG: 325 TABLET ORAL at 08:06

## 2019-06-17 RX ADMIN — MIRTAZAPINE 15 MG: 15 TABLET, ORALLY DISINTEGRATING ORAL at 08:06

## 2019-06-17 RX ADMIN — FOLIC ACID 1 MG: 1 TABLET ORAL at 08:06

## 2019-06-17 NOTE — PLAN OF CARE
Problem: Adult Behavioral Health Plan of Care  Goal: Plan of Care Review  Outcome: Ongoing (interventions implemented as appropriate)  Up and about the unit.  Spending time in the dayroom with peers.  Calm and cooperative.  Endorses depression and has a lot of stress in her life.  Fair interaction with peers.  Settling in to the unit.  Encouraged to attend groups and participate in unit activities.  All precautions maintained.

## 2019-06-17 NOTE — H&P
PSYCHIATRY INPATIENT ADMISSION NOTE - H & P      6/17/2019 7:43 AM   Destiny Moura   1975   [id number]           DATE OF ADMISSION: 6/16/2019 12:16 PM    SITE: [facility]    CURRENT LEGAL STATUS: PEC and/or CEC      HISTORY    CHIEF COMPLAINT   Destiny Moura is a 43 y.o. female with a past psychiatric history of depression, currently admitted to the inpatient unit with the following chief complaint: suicidal ideation    HPI   (Elements: Location, Quality, Severity, Duration, Timing, Content, Modifying Factors, Associated Signs & Symptoms)    The patient was seen and examined. The chart was reviewed.    The patient presented to the ER on 6/16/19 with complaints of suicidal ideation    The patient was medically cleared and admitted to the [facility].     She is treated for depression anxiety by PCP with seroquel 25mg three times per day.  She has been on an antidepressant before but she felt like it made her more suicidal.  She has singificant psychosocial stressors including marital and financial.  Her aunt passed way on Tuesday from cancer.  For the past year she has been depressed to the point of not eating or bathing, just staying at home doing nothing.         Endorses Symptoms of Depression: +diminished mood or loss of +interest/anhedonia irritability, +diminished energy, change in sleep, +change in appetite, +diminished concentration or cognition or indecisiveness, PMA/R, +excessive guilt or hopelessness or worthlessness, +suicidal ideations    Endorses Changes in Sleep: +trouble with initiation, maintenance, early morning awakening with inability to return to sleep, hypersomnolence     Endorses Suicidal/Homicidal ideations: +active/passive ideations, +organized plans, future intentions    Active plan to cut self to the point of death    No psychosis or dl    Endorses Symptoms of TIAN: all of the following excessive anxiety/worry/fear, more days than not, about numerous issues, difficult to control, with  restlessness, fatigue, poor concentration, irritability, muscle tension, sleep disturbance; causes functionally impairing distress     Endorses Symptoms of Panic Disorder: +recurrent panic attacks (palpitations/heart racing, sweating, shakiness, dyspnea, choking, chest pain/discomfort, Gi symptoms, dizzy/lightheadedness, hot/col flashes, paresthesias, derealization, fear of losing control or fear of dying), precipitated or un-precipitated, +source of worry and/or behavioral changes secondary, +with or without agoraphobia    She has had some syncope and wore a halter monitor for 21 days, awaiting the results from that    Endorses Symptoms of PTSD: +h/o trauma; +re-experiencing/intrusive symptoms, avoidant behavior, +negative alterations in cognition or mood, +hyperarousal symptoms; with or without dissociative symptoms     Past diagnosis of anorexia because of ptsd trauma    Endorses Substance Use: +Tolerance, Withdrawal, Loss of control, Social / Occupational Consequence,  Risky / Continued Use, State of Change (pre-contemplative, +contemplative, preparation, action, maintenance, termination)      PSYCHOTHERAPY ADD-ON +[id number]   30 (16-37*) minutes    Time: 16 minutes  Participants: Met with patient    Therapeutic Intervention Type: behavior modifying psychotherapy, supportive psychotherapy  Why chosen therapy is appropriate versus another modality: relevant to diagnosis, patient responds to this modality, evidence based practice    Target symptoms: depression, substance abuse  Primary focus: rapport buildilng  Psychotherapeutic techniques: supportive psychoeducation    Outcome monitoring methods: self-report, observation    Patient's response to intervention:  The patient's response to intervention is accepting.    Progress toward goals:  The patient's progress toward goals is fair .            PAST PSYCHIATRIC HISTORY  Previous Psychiatric Hospitalizations: no   Previous SI/HI: yes  Previous Suicide Attempts: no    Previous Medication Trials: yes  Psychiatric Care (current & past): pcp  History of Psychotherapy: no  History of Violence: no      SUBSTANCE ABUSE HISTORY   Tobacco: no  Alcohol: no  Illicit Substances: marijuana amphetamines  Misuse of Prescription Medications: no  Detoxes: no  Rehabs: no  12 Step Meetings: no  Periods of Sobriety: no  Withdrawal: no        PAST MEDICAL & SURGICAL HISTORY   Past Medical History:   Diagnosis Date    Anxiety     Chlamydia     Dx'ed around 16-17    Depression     Headache     Hx of psychiatric care     Panic disorder     Psychiatric problem     Sleep difficulties      Past Surgical History:   Procedure Laterality Date    DILATION AND CURETTAGE OF UTERUS      hysterosalpingogram      Done at age 26 which demonstrated blockage of both tubes    PELVIC LAPAROSCOPY           CURRENT MEDICATION REGIMEN   Home Meds:   Prior to Admission medications    Medication Sig Start Date End Date Taking? Authorizing Provider   methocarbamol (ROBAXIN) 750 MG Tab  1/9/19  Yes Historical Provider, MD   quetiapine (SEROQUEL) 25 MG Tab Take by mouth.   Yes Historical Provider, MD   cyproheptadine (PERIACTIN) 4 mg tablet  1/8/19   Historical Provider, MD   diphenhydrAMINE (BENADRYL) 50 MG capsule Take 1 capsule (50 mg total) by mouth every 6 (six) hours as needed for Itching. 7/15/17   Henrik Allen MD         OTC Meds:     Scheduled Meds:    folic acid  1 mg Oral Daily    multivitamin  1 tablet Oral Daily      PRN Meds: acetaminophen, aluminum-magnesium hydroxide-simethicone, docusate sodium, hydrOXYzine pamoate, loperamide, OLANZapine **AND** OLANZapine   Psychotherapeutics (From admission, onward)    Start     Stop Route Frequency Ordered    06/16/19 1421  OLANZapine injection 10 mg  (Olanzapine)      -- IM Every 8 hours PRN 06/16/19 1321    06/16/19 1421  OLANZapine tablet 10 mg  (Olanzapine)      -- Oral Every 8 hours PRN 06/16/19 1321            ALLERGIES   Review of patient's  allergies indicates:  No Known Allergies      NEUROLOGIC HISTORY  Seizures: no   Head trauma: no       FAMILY PSYCHIATRIC HISTORY   Family History   Problem Relation Age of Onset    Thyroid disease Mother     No Known Problems Father     Anxiety disorder Maternal Aunt     Depression Maternal Aunt     Breast cancer Neg Hx     Colon cancer Neg Hx     Ovarian cancer Neg Hx        SOCIAL HISTORY  Developmental/Childhood: met all milestones  History of Physical/Sexual Abuse: no  Education: 10th    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   Financial: strained   Relationship Status/Sexual Orientation:    Children: 5 adult step children  Housing Status: with     Anabaptism: Confucianism   History: no   Recreational Activities: nothing  Access to Gun: no       LEGAL HISTORY   Past Charges/Incarcerations:yes   Pending Charges: no      ROS  Review of systems  Constitutional: Tired  Musculoskeletal: No back, neck, muscle, or joint pain  Respiratory: No cough or shortness of breath  Cardiovascular: No chest pain, palpitations, or leg swelling  Gastrointestinal: No abdominal pain, nausea vomiting, or diarrhea  Genitourinary: No pain on urination  Neurologic: +dizziness and headache  Eyes: No change in vision  HENT: No congestion, hearing problem, tinnitus, dysphagia, or sore throat  Skin: No rash or wound      EXAMINATION      PHYSICAL EXAM  Reviewed note/exam by Dr. Allen from [facility] at 6/16/19    VITALS   Vitals:    06/16/19 2018   BP: (!) 122/90   Pulse: 109   Resp: 18   Temp: 98 °F (36.7 °C)          PAIN  0/10  Subjective report of pain matches objective signs and symptoms: Yes      LABORATORY DATA   Recent Results (from the past 72 hour(s))   Comprehensive metabolic panel    Collection Time: 06/16/19  9:59 AM   Result Value Ref Range    Sodium 138 136 - 145 mmol/L    Potassium 3.1 (L) 3.5 - 5.1 mmol/L    Chloride 104 95 - 110 mmol/L    CO2 23 23 - 29 mmol/L    Glucose 102 70 - 110 mg/dL    BUN, Bld 7  6 - 20 mg/dL    Creatinine 0.8 0.5 - 1.4 mg/dL    Calcium 9.7 8.7 - 10.5 mg/dL    Total Protein 8.8 (H) 6.0 - 8.4 g/dL    Albumin 4.1 3.5 - 5.2 g/dL    Total Bilirubin 0.3 0.1 - 1.0 mg/dL    Alkaline Phosphatase 93 55 - 135 U/L    AST 11 10 - 40 U/L    ALT 5 (L) 10 - 44 U/L    Anion Gap 11 8 - 16 mmol/L    eGFR if African American >60 >60 mL/min/1.73 m^2    eGFR if non African American >60 >60 mL/min/1.73 m^2   CBC auto differential    Collection Time: 06/16/19  9:59 AM   Result Value Ref Range    WBC 5.55 3.90 - 12.70 K/uL    RBC 5.15 4.00 - 5.40 M/uL    Hemoglobin 12.1 12.0 - 16.0 g/dL    Hematocrit 37.2 37.0 - 48.5 %    Mean Corpuscular Volume 72 (L) 82 - 98 fL    Mean Corpuscular Hemoglobin 23.5 (L) 27.0 - 31.0 pg    Mean Corpuscular Hemoglobin Conc 32.5 32.0 - 36.0 g/dL    RDW 15.1 (H) 11.5 - 14.5 %    Platelets 335 150 - 350 K/uL    MPV 10.3 9.2 - 12.9 fL    Gran # (ANC) 3.8 1.8 - 7.7 K/uL    Lymph # 1.1 1.0 - 4.8 K/uL    Mono # 0.7 0.3 - 1.0 K/uL    Eos # 0.0 0.0 - 0.5 K/uL    Baso # 0.01 0.00 - 0.20 K/uL    Gran% 67.5 38.0 - 73.0 %    Lymph% 19.5 18.0 - 48.0 %    Mono% 12.8 4.0 - 15.0 %    Eosinophil% 0.0 0.0 - 8.0 %    Basophil% 0.2 0.0 - 1.9 %    Differential Method Automated    Troponin I    Collection Time: 06/16/19  9:59 AM   Result Value Ref Range    Troponin I <0.006 0.000 - 0.026 ng/mL   CK-MB    Collection Time: 06/16/19  9:59 AM   Result Value Ref Range     20 - 180 U/L    CPK MB 1.4 0.1 - 6.5 ng/mL    MB% 1.3 0.0 - 5.0 %   CK    Collection Time: 06/16/19  9:59 AM   Result Value Ref Range     20 - 180 U/L   Brain natriuretic peptide    Collection Time: 06/16/19  9:59 AM   Result Value Ref Range    BNP 50 0 - 99 pg/mL   Protime-INR    Collection Time: 06/16/19  9:59 AM   Result Value Ref Range    Prothrombin Time 10.9 9.0 - 12.5 sec    INR 1.0 0.8 - 1.2   APTT    Collection Time: 06/16/19  9:59 AM   Result Value Ref Range    aPTT 32.9 (H) 21.0 - 32.0 sec   D dimer, quantitative     Collection Time: 06/16/19  9:59 AM   Result Value Ref Range    D-Dimer 0.26 <0.50 mg/L FEU   TSH    Collection Time: 06/16/19  9:59 AM   Result Value Ref Range    TSH 3.184 0.400 - 4.000 uIU/mL   Magnesium    Collection Time: 06/16/19  9:59 AM   Result Value Ref Range    Magnesium 2.0 1.6 - 2.6 mg/dL   Phosphorus    Collection Time: 06/16/19  9:59 AM   Result Value Ref Range    Phosphorus 2.8 2.7 - 4.5 mg/dL   Ethanol    Collection Time: 06/16/19  9:59 AM   Result Value Ref Range    Alcohol, Medical, Serum <10 <10 mg/dL   Salicylate level    Collection Time: 06/16/19  9:59 AM   Result Value Ref Range    Salicylate Lvl <5.0 (L) 15.0 - 30.0 mg/dL   Acetaminophen level    Collection Time: 06/16/19  9:59 AM   Result Value Ref Range    Acetaminophen (Tylenol), Serum <3.0 (L) 10.0 - 20.0 ug/mL   Lipid panel    Collection Time: 06/16/19  9:59 AM   Result Value Ref Range    Cholesterol 148 120 - 199 mg/dL    Triglycerides 44 30 - 150 mg/dL    HDL 39 (L) 40 - 75 mg/dL    LDL Cholesterol 100.2 63.0 - 159.0 mg/dL    Hdl/Cholesterol Ratio 26.4 20.0 - 50.0 %    Total Cholesterol/HDL Ratio 3.8 2.0 - 5.0    Non-HDL Cholesterol 109 mg/dL   Urinalysis, Reflex to Urine Culture Urine, Clean Catch    Collection Time: 06/16/19 10:36 AM   Result Value Ref Range    Specimen UA Urine, Clean Catch     Color, UA Yellow Yellow, Straw, Rowena    Appearance, UA Clear Clear    pH, UA 6.0 5.0 - 8.0    Specific Gravity, UA 1.020 1.005 - 1.030    Protein, UA Negative Negative    Glucose, UA Negative Negative    Ketones, UA 1+ (A) Negative    Bilirubin (UA) Negative Negative    Occult Blood UA Negative Negative    Nitrite, UA Negative Negative    Urobilinogen, UA Negative <2.0 EU/dL    Leukocytes, UA Negative Negative   Pregnancy, urine rapid    Collection Time: 06/16/19 10:36 AM   Result Value Ref Range    Preg Test, Ur Negative    Drug screen panel, emergency    Collection Time: 06/16/19 10:36 AM   Result Value Ref Range    Benzodiazepines Negative  "    Methadone metabolites Negative     Cocaine (Metab.) Negative     Opiate Scrn, Ur Negative     Barbiturate Screen, Ur Negative     Amphetamine Screen, Ur Presumptive Positive     THC Presumptive Positive     Phencyclidine Negative     Creatinine, Random Ur 176.9 15.0 - 325.0 mg/dL    Toxicology Information SEE COMMENT       No results found for: PHENYTOIN, PHENOBARB, VALPROATE, CBMZ        CONSTITUTIONAL  General Appearance: ; NAD    MUSCULOSKELETAL  Muscle Strength and Tone:  normal  Abnormal Involuntary Movements:  none  Gait and Station:  normal; non-ataxic    PSYCHIATRIC   Level of Consciousness: awake, alert  Orientation: p/p/t/s  Grooming: in hospital garb adequate to circumstances  Psychomotor Behavior: no PMA/R  Speech: nl r/t/v/s  Language: able to repeat words English fluent  Mood: "depressed"  Affect: dysphoric  Thought Process:  linear and organized  Associations:  intact; no LILIANA  Thought Content: +SI denied AVH/delusions; denied HI  Memory:  intact to recent and remote events  Attention:  intact to conversation; not distractible   Fund of Knowledge:  age and education appropriate  Estimate if Intelligence:  average based on work/education history, vocabulary and mental status exam  Insight:  good- seeks help  Judgment:   good- no bx issues, compliant and cooperative        PSYCHOSOCIAL      PSYCHOSOCIAL STRESSORS   family, financial, marital and drug and alcohol    FUNCTIONING RELATIONSHIPS   alone & isolated      STRENGTHS AND LIABILITIES   Strength: Patient accepts guidance/feedback, Strength: Patient is motivated for change., Liability: Patient lacks coping skills.      Is the patient aware of the biomedical complications associated with substance abuse and mental illness? yes    Does the patient have an Advance Directive for Mental Health treatment? no  (If yes, inform patient to bring copy.)        ASSESSMENT     IMPRESSION   Major Depressive Disorder Severe Recurrent without " psychosis  TIAN  Panic with agoraphobia  PTSD    Microcytosis  Hypokalemia  Chronic back pain    MEDICAL DECISION MAKING        PROBLEM LIST AND MANAGEMENT PLANS    PRESCRIPTION DRUG MANAGEMENT  Compliance: yes  Side Effects: no  Regimen Adjustments:     Mood - Counseled, Start Serqouel 25mg QHS and mirtazapine 15mg QHS with plan to increase if tolerating mirtazapine    Anxiety - Counseled, Seroquel and mirtazapine per above    PTSD - Counseled, consider prazosin    Microcytosis - awaiting medicine consult    Hypokalemia - potassium solution    Chronic back pain - will consider giving robaxin      DIAGNOSTIC TESTING  Labs reviewed; follow up pending labs;     Disposition:  -SW to assist with aftercare planning and collateral  -Once stable discharge home with outpatient follow up care and/or rehab  -Continue inpatient treatment under a PEC and/or CEC for danger to self,and grave disability as evident by voiced suicidal ideation in the context of depression and severe psychosocial stress      Jabari Blair MD  Psychiatry

## 2019-06-17 NOTE — PROGRESS NOTES
"   06/17/19 1045   [facility] Group Therapy   Group Name Leisure Skills Training   Specific Interventions Skilled Activity Leisure Education and Awareness   Participation Level Active;Sharing   Participation Quality Cooperative;Requires Prompting   Insight/Motivation Applies New Skills;Good   Affect/Mood Display Depressed   Cognition Alert;Oriented   Psychomotor WNL   Patient states "my brain is slow today. I'm still on my aunt's death, feeling depressed." Patient states the activity made her "laugh and smile a little. " Patient displayed good team work and states she used her "brain" for this activity.  "

## 2019-06-17 NOTE — PLAN OF CARE
Problem: Cognitive Impairment (Depressive Signs/Symptoms)  Goal: Improved Ability to Think and Concentrate (Depressive Signs/Symptoms)  Outcome: Ongoing (interventions implemented as appropriate)  Shift note :patient is depressed . She is isolating in her room . She is eating all meals and is taking all ordered medications . She is having grief over her aunts death one week ago . She will work in therapy with her grief stages . She is on an antidepressant to help her depression . She will attend groups to improve her coping skills .

## 2019-06-17 NOTE — HPI
44 yo female patient admitted via ER to [facility] for depression and suicidal ideation. Medicine consulted for H/p. VSS/afebrile. Labs reviewed. Hypokalemic given potassium in ER. + UDS.

## 2019-06-17 NOTE — PLAN OF CARE
Problem: Adult Behavioral Health Plan of Care  Goal: Plan of Care Review  Outcome: Ongoing (interventions implemented as appropriate)  Pt has slept 7 hours without any interruptions so far.  NAD.  Resp even & unlabored.  Pathways clear and bed is low.  Q 15 minute safety checks ongoing.  All precautions maintained.

## 2019-06-17 NOTE — PLAN OF CARE
Problem: Adult Behavioral Health Plan of Care  Goal: Optimized Coping Skills in Response to Life Stressors    Intervention: Promote Effective Coping Strategies  Group Note          Behavior    Patient attended group psychotherapy today. Patient exhibited depressed mood with appropriate affect.       Intervention     CBT-based group psychotherapy today focused on the development of internal coping strategies to confront depression, addiction or behavior unbecoming of a patient who desires to carry out life principles.         Response    Patient stated that she plans to really apply not scaring anyone away with her depression. Patient agreed that if she can continue to get good care here she will be the person she has always wanted to be and eventually be a good caregiver herself in her daily interactions in society. Patient remarked that she very much enjoyed the talk given concerning internal coping strategies. Patient volunteered to be the first group member to participate in the role playing scenario.         Plan    To continue to encourage the patient to attend group psychotherapy with focused attention on further work in the area of internal coping strategies; a personal inventory and an introduction to the 12 step model will continue to be given. This includes exploring how the 12 steps may approach depression and anxiety through the emotions anonymous movement.

## 2019-06-17 NOTE — PLAN OF CARE
"Problem: Adult Behavioral Health Plan of Care  Goal: Rounds/Family Conference  Outcome: Ongoing (interventions implemented as appropriate)  TREATMENT TEAM UPDATE      Chief Complaint:    Patient came to the hospital for complaints involving depression and suicidal thoughts. Patient also exhibited anxiety. Patient is currently having marital problems and financial problems. Patient is experiencing grief over her aunt's death approximately a week ago as of this progress note. Patient uses marijuana and was positive for amphetamine use; says it was a one time use of "an x pill."       Current:    Patient is in agreement that she needs to go to [facility] for continued treatment.     Patient stated that she feels she would benefit from at least "one one on one" session while here.     Plan:    Patient will be introduced to the concept of reviewing what she is and is not responsible for; will also be introduced to either the stages of change or the 12 step movement.       Patient will be encouraged to attend group psychotherapy with focused attention on exploring safety planning.                                         "

## 2019-06-17 NOTE — CONSULTS
[facility] Medicine  Consult Note    Patient Name: Destiny Moura  MRN: [id number]  Admission Date: 6/16/2019  Hospital Length of Stay: 1 days  Attending Physician: Jabari Blair MD   Primary Care Provider: Primary Doctor No           Patient information was obtained from patient and ER records.     Inpatient consult to [facility] for History and Physical  Consult performed by: Siobhan Geronimo NP  Consult ordered by: Jabari Blair MD        Subjective:     Principal Problem: <principal problem not specified>    Chief Complaint: No chief complaint on file.       HPI: 42 yo female patient admitted via ER to [facility] for depression and suicidal ideation. Medicine consulted for H/p. VSS/afebrile. Labs reviewed. Hypokalemic given potassium in ER. + UDS.     Past Medical History:   Diagnosis Date    Anxiety     Chlamydia     Dx'ed around 16-17    Depression     Headache     Hx of psychiatric care     Panic disorder     Psychiatric problem     Sleep difficulties        Past Surgical History:   Procedure Laterality Date    DILATION AND CURETTAGE OF UTERUS      hysterosalpingogram      Done at age 26 which demonstrated blockage of both tubes    PELVIC LAPAROSCOPY         Review of patient's allergies indicates:  No Known Allergies    Current Facility-Administered Medications on File Prior to Encounter   Medication    [DISCONTINUED] diphenhydrAMINE injection 50 mg    [DISCONTINUED] haloperidol lactate injection 5 mg    [DISCONTINUED] lorazepam injection 2 mg     Current Outpatient Medications on File Prior to Encounter   Medication Sig    methocarbamol (ROBAXIN) 750 MG Tab     quetiapine (SEROQUEL) 25 MG Tab Take by mouth.    cyproheptadine (PERIACTIN) 4 mg tablet     diphenhydrAMINE (BENADRYL) 50 MG capsule Take 1 capsule (50 mg total) by mouth every 6 (six) hours as needed for Itching.     Family History     Problem Relation (Age of Onset)    Anxiety disorder  Maternal Aunt    Depression Maternal Aunt    No Known Problems Father    Thyroid disease Mother        Tobacco Use    Smoking status: Never Smoker    Smokeless tobacco: Never Used   Substance and Sexual Activity    Alcohol use: Yes     Comment: occasionally    Drug use: Yes     Types: Marijuana     Comment: Uses MJ on occasion,took a ''X'' pill yesterday    Sexual activity: Yes     Partners: Male     Birth control/protection: None     Review of Systems   Constitutional: Negative for chills, fatigue, fever and unexpected weight change.   HENT: Negative for congestion, ear pain, sore throat and trouble swallowing.    Eyes: Negative for pain and visual disturbance.   Respiratory: Negative for cough, chest tightness and shortness of breath.    Cardiovascular: Negative for chest pain, palpitations and leg swelling.   Gastrointestinal: Negative for abdominal distention, abdominal pain, constipation, diarrhea and vomiting.   Genitourinary: Negative for difficulty urinating, dysuria, flank pain, frequency and hematuria.   Musculoskeletal: Negative for back pain, gait problem, joint swelling, neck pain and neck stiffness.   Skin: Negative for rash and wound.   Neurological: Negative for dizziness, seizures, speech difficulty, light-headedness and headaches.     Objective:     Vital Signs (Most Recent):  Temp: 97.7 °F (36.5 °C) (06/17/19 0758)  Pulse: 95 (06/17/19 0758)  Resp: 16 (06/17/19 0758)  BP: 103/67 (06/17/19 0758) Vital Signs (24h Range):  Temp:  [97.4 °F (36.3 °C)-98.3 °F (36.8 °C)] 97.7 °F (36.5 °C)  Pulse:  [] 95  Resp:  [16-20] 16  SpO2:  [100 %] 100 %  BP: (103-124)/(67-90) 103/67     Weight: 73.7 kg (162 lb 7.7 oz)  Body mass index is 23.31 kg/m².    Physical Exam   Constitutional: She is oriented to person, place, and time. She appears well-developed and well-nourished.   HENT:   Head: Normocephalic and atraumatic.   Neck: No thyromegaly present.   Cardiovascular: Normal rate, regular rhythm, normal  heart sounds and intact distal pulses.   No murmur heard.  Pulmonary/Chest: Effort normal and breath sounds normal. No respiratory distress. She has no wheezes. She has no rales.   Abdominal: Soft. Bowel sounds are normal. She exhibits no distension and no mass. There is no tenderness.   Musculoskeletal: Normal range of motion. She exhibits no edema.   Lymphadenopathy:     She has no cervical adenopathy.   Neurological: She is alert and oriented to person, place, and time.     Neuro: Cranial nerves:  CN II Visual fields full to confrontation.   CN III, IV, VI Pupils are equal, round, and reactive to light.  CN III: no palsy  Nystagmus: none   Diplopia: none  Ophthalmoparesis: none  CN V Facial sensation intact.   CN VII Facial expression full, symmetric.   CN VIII normal.   CN IX normal.   CN X normal.   CN XI normal.   CN XII normal.         Skin: Skin is warm and dry. No erythema.   Vitals reviewed.      Significant Labs:  UPT negative   U/A negative  UDS  Results for orders placed or performed during the hospital encounter of 06/16/19   Drug screen panel, emergency   Result Value Ref Range    Benzodiazepines Negative     Methadone metabolites Negative     Cocaine (Metab.) Negative     Opiate Scrn, Ur Negative     Barbiturate Screen, Ur Negative     Amphetamine Screen, Ur Presumptive Positive     THC Presumptive Positive     Phencyclidine Negative     Creatinine, Random Ur 176.9 15.0 - 325.0 mg/dL    Toxicology Information SEE COMMENT      CBC  Results for orders placed or performed during the hospital encounter of 06/16/19   CBC auto differential   Result Value Ref Range    WBC 5.55 3.90 - 12.70 K/uL    RBC 5.15 4.00 - 5.40 M/uL    Hemoglobin 12.1 12.0 - 16.0 g/dL    Hematocrit 37.2 37.0 - 48.5 %    Mean Corpuscular Volume 72 (L) 82 - 98 fL    Mean Corpuscular Hemoglobin 23.5 (L) 27.0 - 31.0 pg    Mean Corpuscular Hemoglobin Conc 32.5 32.0 - 36.0 g/dL    RDW 15.1 (H) 11.5 - 14.5 %    Platelets 335 150 - 350 K/uL     MPV 10.3 9.2 - 12.9 fL    Gran # (ANC) 3.8 1.8 - 7.7 K/uL    Lymph # 1.1 1.0 - 4.8 K/uL    Mono # 0.7 0.3 - 1.0 K/uL    Eos # 0.0 0.0 - 0.5 K/uL    Baso # 0.01 0.00 - 0.20 K/uL    Gran% 67.5 38.0 - 73.0 %    Lymph% 19.5 18.0 - 48.0 %    Mono% 12.8 4.0 - 15.0 %    Eosinophil% 0.0 0.0 - 8.0 %    Basophil% 0.2 0.0 - 1.9 %    Differential Method Automated      CMP  Results for orders placed or performed during the hospital encounter of 06/16/19   Comprehensive metabolic panel   Result Value Ref Range    Sodium 138 136 - 145 mmol/L    Potassium 3.1 (L) 3.5 - 5.1 mmol/L    Chloride 104 95 - 110 mmol/L    CO2 23 23 - 29 mmol/L    Glucose 102 70 - 110 mg/dL    BUN, Bld 7 6 - 20 mg/dL    Creatinine 0.8 0.5 - 1.4 mg/dL    Calcium 9.7 8.7 - 10.5 mg/dL    Total Protein 8.8 (H) 6.0 - 8.4 g/dL    Albumin 4.1 3.5 - 5.2 g/dL    Total Bilirubin 0.3 0.1 - 1.0 mg/dL    Alkaline Phosphatase 93 55 - 135 U/L    AST 11 10 - 40 U/L    ALT 5 (L) 10 - 44 U/L    Anion Gap 11 8 - 16 mmol/L    eGFR if African American >60 >60 mL/min/1.73 m^2    eGFR if non African American >60 >60 mL/min/1.73 m^2     TSH  Results for orders placed or performed during the hospital encounter of 06/16/19   TSH   Result Value Ref Range    TSH 3.184 0.400 - 4.000 uIU/mL     ETOH  Results for orders placed or performed during the hospital encounter of 06/16/19   Ethanol   Result Value Ref Range    Alcohol, Medical, Serum <10 <10 mg/dL     Salicylate  Results for orders placed or performed during the hospital encounter of 06/16/19   Salicylate level   Result Value Ref Range    Salicylate Lvl <5.0 (L) 15.0 - 30.0 mg/dL     Acetaminophen  Results for orders placed or performed during the hospital encounter of 06/16/19   Acetaminophen level   Result Value Ref Range    Acetaminophen (Tylenol), Serum <3.0 (L) 10.0 - 20.0 ug/mL             Assessment/Plan:     Hypokalemia  potassium replaced in ER      Depression with suicidal ideation  Further orders per  psych        VTE Risk Mitigation (From admission, onward)    None              Thank you for your consult. I will sign off. Please contact us if you have any additional questions.    Siobhan Geronimo NP  Department of Hospital Medicine   [facility]

## 2019-06-17 NOTE — SUBJECTIVE & OBJECTIVE
Past Medical History:   Diagnosis Date    Anxiety     Chlamydia     Dx'ed around 16-17    Depression     Headache     Hx of psychiatric care     Panic disorder     Psychiatric problem     Sleep difficulties        Past Surgical History:   Procedure Laterality Date    DILATION AND CURETTAGE OF UTERUS      hysterosalpingogram      Done at age 26 which demonstrated blockage of both tubes    PELVIC LAPAROSCOPY         Review of patient's allergies indicates:  No Known Allergies    Current Facility-Administered Medications on File Prior to Encounter   Medication    [DISCONTINUED] diphenhydrAMINE injection 50 mg    [DISCONTINUED] haloperidol lactate injection 5 mg    [DISCONTINUED] lorazepam injection 2 mg     Current Outpatient Medications on File Prior to Encounter   Medication Sig    methocarbamol (ROBAXIN) 750 MG Tab     quetiapine (SEROQUEL) 25 MG Tab Take by mouth.    cyproheptadine (PERIACTIN) 4 mg tablet     diphenhydrAMINE (BENADRYL) 50 MG capsule Take 1 capsule (50 mg total) by mouth every 6 (six) hours as needed for Itching.     Family History     Problem Relation (Age of Onset)    Anxiety disorder Maternal Aunt    Depression Maternal Aunt    No Known Problems Father    Thyroid disease Mother        Tobacco Use    Smoking status: Never Smoker    Smokeless tobacco: Never Used   Substance and Sexual Activity    Alcohol use: Yes     Comment: occasionally    Drug use: Yes     Types: Marijuana     Comment: Uses MJ on occasion,took a ''X'' pill yesterday    Sexual activity: Yes     Partners: Male     Birth control/protection: None     Review of Systems   Constitutional: Negative for chills, fatigue, fever and unexpected weight change.   HENT: Negative for congestion, ear pain, sore throat and trouble swallowing.    Eyes: Negative for pain and visual disturbance.   Respiratory: Negative for cough, chest tightness and shortness of breath.    Cardiovascular: Negative for chest pain, palpitations  and leg swelling.   Gastrointestinal: Negative for abdominal distention, abdominal pain, constipation, diarrhea and vomiting.   Genitourinary: Negative for difficulty urinating, dysuria, flank pain, frequency and hematuria.   Musculoskeletal: Negative for back pain, gait problem, joint swelling, neck pain and neck stiffness.   Skin: Negative for rash and wound.   Neurological: Negative for dizziness, seizures, speech difficulty, light-headedness and headaches.     Objective:     Vital Signs (Most Recent):  Temp: 97.7 °F (36.5 °C) (06/17/19 0758)  Pulse: 95 (06/17/19 0758)  Resp: 16 (06/17/19 0758)  BP: 103/67 (06/17/19 0758) Vital Signs (24h Range):  Temp:  [97.4 °F (36.3 °C)-98.3 °F (36.8 °C)] 97.7 °F (36.5 °C)  Pulse:  [] 95  Resp:  [16-20] 16  SpO2:  [100 %] 100 %  BP: (103-124)/(67-90) 103/67     Weight: 73.7 kg (162 lb 7.7 oz)  Body mass index is 23.31 kg/m².    Physical Exam   Constitutional: She is oriented to person, place, and time. She appears well-developed and well-nourished.   HENT:   Head: Normocephalic and atraumatic.   Neck: No thyromegaly present.   Cardiovascular: Normal rate, regular rhythm, normal heart sounds and intact distal pulses.   No murmur heard.  Pulmonary/Chest: Effort normal and breath sounds normal. No respiratory distress. She has no wheezes. She has no rales.   Abdominal: Soft. Bowel sounds are normal. She exhibits no distension and no mass. There is no tenderness.   Musculoskeletal: Normal range of motion. She exhibits no edema.   Lymphadenopathy:     She has no cervical adenopathy.   Neurological: She is alert and oriented to person, place, and time.     Neuro: Cranial nerves:  CN II Visual fields full to confrontation.   CN III, IV, VI Pupils are equal, round, and reactive to light.  CN III: no palsy  Nystagmus: none   Diplopia: none  Ophthalmoparesis: none  CN V Facial sensation intact.   CN VII Facial expression full, symmetric.   CN VIII normal.   CN IX normal.   CN X  normal.   CN XI normal.   CN XII normal.         Skin: Skin is warm and dry. No erythema.   Vitals reviewed.      Significant Labs:  UPT negative   U/A negative  UDS  Results for orders placed or performed during the hospital encounter of 06/16/19   Drug screen panel, emergency   Result Value Ref Range    Benzodiazepines Negative     Methadone metabolites Negative     Cocaine (Metab.) Negative     Opiate Scrn, Ur Negative     Barbiturate Screen, Ur Negative     Amphetamine Screen, Ur Presumptive Positive     THC Presumptive Positive     Phencyclidine Negative     Creatinine, Random Ur 176.9 15.0 - 325.0 mg/dL    Toxicology Information SEE COMMENT      CBC  Results for orders placed or performed during the hospital encounter of 06/16/19   CBC auto differential   Result Value Ref Range    WBC 5.55 3.90 - 12.70 K/uL    RBC 5.15 4.00 - 5.40 M/uL    Hemoglobin 12.1 12.0 - 16.0 g/dL    Hematocrit 37.2 37.0 - 48.5 %    Mean Corpuscular Volume 72 (L) 82 - 98 fL    Mean Corpuscular Hemoglobin 23.5 (L) 27.0 - 31.0 pg    Mean Corpuscular Hemoglobin Conc 32.5 32.0 - 36.0 g/dL    RDW 15.1 (H) 11.5 - 14.5 %    Platelets 335 150 - 350 K/uL    MPV 10.3 9.2 - 12.9 fL    Gran # (ANC) 3.8 1.8 - 7.7 K/uL    Lymph # 1.1 1.0 - 4.8 K/uL    Mono # 0.7 0.3 - 1.0 K/uL    Eos # 0.0 0.0 - 0.5 K/uL    Baso # 0.01 0.00 - 0.20 K/uL    Gran% 67.5 38.0 - 73.0 %    Lymph% 19.5 18.0 - 48.0 %    Mono% 12.8 4.0 - 15.0 %    Eosinophil% 0.0 0.0 - 8.0 %    Basophil% 0.2 0.0 - 1.9 %    Differential Method Automated      CMP  Results for orders placed or performed during the hospital encounter of 06/16/19   Comprehensive metabolic panel   Result Value Ref Range    Sodium 138 136 - 145 mmol/L    Potassium 3.1 (L) 3.5 - 5.1 mmol/L    Chloride 104 95 - 110 mmol/L    CO2 23 23 - 29 mmol/L    Glucose 102 70 - 110 mg/dL    BUN, Bld 7 6 - 20 mg/dL    Creatinine 0.8 0.5 - 1.4 mg/dL    Calcium 9.7 8.7 - 10.5 mg/dL    Total Protein 8.8 (H) 6.0 - 8.4 g/dL    Albumin  4.1 3.5 - 5.2 g/dL    Total Bilirubin 0.3 0.1 - 1.0 mg/dL    Alkaline Phosphatase 93 55 - 135 U/L    AST 11 10 - 40 U/L    ALT 5 (L) 10 - 44 U/L    Anion Gap 11 8 - 16 mmol/L    eGFR if African American >60 >60 mL/min/1.73 m^2    eGFR if non African American >60 >60 mL/min/1.73 m^2     TSH  Results for orders placed or performed during the hospital encounter of 06/16/19   TSH   Result Value Ref Range    TSH 3.184 0.400 - 4.000 uIU/mL     ETOH  Results for orders placed or performed during the hospital encounter of 06/16/19   Ethanol   Result Value Ref Range    Alcohol, Medical, Serum <10 <10 mg/dL     Salicylate  Results for orders placed or performed during the hospital encounter of 06/16/19   Salicylate level   Result Value Ref Range    Salicylate Lvl <5.0 (L) 15.0 - 30.0 mg/dL     Acetaminophen  Results for orders placed or performed during the hospital encounter of 06/16/19   Acetaminophen level   Result Value Ref Range    Acetaminophen (Tylenol), Serum <3.0 (L) 10.0 - 20.0 ug/mL

## 2019-06-18 PROCEDURE — 25000003 PHARM REV CODE 250: Performed by: PSYCHIATRY & NEUROLOGY

## 2019-06-18 PROCEDURE — 99233 PR SUBSEQUENT HOSPITAL CARE,LEVL III: ICD-10-PCS | Mod: AF,HB,S$PBB, | Performed by: PSYCHIATRY & NEUROLOGY

## 2019-06-18 PROCEDURE — 99233 SBSQ HOSP IP/OBS HIGH 50: CPT | Mod: AF,HB,S$PBB, | Performed by: PSYCHIATRY & NEUROLOGY

## 2019-06-18 PROCEDURE — 11400000 HC PSYCH PRIVATE ROOM

## 2019-06-18 RX ADMIN — THERA TABS 1 TABLET: TAB at 08:06

## 2019-06-18 RX ADMIN — HYDROXYZINE PAMOATE 50 MG: 50 CAPSULE ORAL at 09:06

## 2019-06-18 RX ADMIN — QUETIAPINE FUMARATE 25 MG: 25 TABLET ORAL at 08:06

## 2019-06-18 RX ADMIN — FOLIC ACID 1 MG: 1 TABLET ORAL at 08:06

## 2019-06-18 RX ADMIN — ACETAMINOPHEN 650 MG: 325 TABLET ORAL at 07:06

## 2019-06-18 RX ADMIN — MIRTAZAPINE 15 MG: 15 TABLET, ORALLY DISINTEGRATING ORAL at 08:06

## 2019-06-18 NOTE — PROGRESS NOTES
06/18/19 1045   [facility] Group Therapy   Group Name Therapeutic Recreation   Specific Interventions Coping Skills Training   Participation Level Active   Participation Quality Cooperative   Insight/Motivation Applies New Skills;Good   Affect/Mood Display Depressed   Cognition Alert   Psychomotor WNL   Patient willing to explore healthy leisure options. Patient shows interest and initial ability to comprehend directions.
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
## 2019-06-18 NOTE — PLAN OF CARE
Problem: Adult Behavioral Health Plan of Care  Goal: Develops/Participates in Therapeutic [place] to Support Successful Transition  Outcome: Ongoing (interventions implemented as appropriate)  Shift note : patient states that she feels better since she has been here . States that she has trouble with anxiety , depression and panic attacks . She is attending all groups to develop better coping skills and problem solving . She is eating all meals .

## 2019-06-18 NOTE — PROGRESS NOTES
"   19 1031   Assessment   Patient's Identification of the Problem Patient presents tearful, depressed and "I'm ok, feeling lonely" mood. Patient states her admit is due to "depression and suicidal thoughts." Patient reports her aunt passes last Tuesday,, financial and marital problems, relationship problems with her parents(given up for adoption at age 3, adopted mom is  per patient). Patient reports she is ,(5 years), no children, 10th grade education(some special education classes), unemployed(in past worked as CNA), is suppose to wear reading glasses, lives with her  in [place]. Patient admits to using marijuana(1 every 2 weeks). Patient verbalized main goal "my biggest thing and most important thing is having a relationship with my mom."   Leisure Interest Watching TV;Listen to Music   Leisure Barriers In Pain;Lack of Transportation;Loss of Interest;Lack of Finances;Energy Level;Social Support;Physical Limitation;Fears/Phobias;Other (See Comments)  (back pain,scoliosis. fear water and heights)   Treatment Focus To Improve Mood;To Increase Motivation;To Improve Leisure Awareness/Lifestyle/Interest;To Improve Coping Skills;To Increase Energy Level;To Promote Social Skills/Tolerance/Interaction     Treatment Recommendation: To address problem(s) #  1:1 Intervention (as needed)    Cognitive Stimulation Skilled Activity  Self Expression Skilled Activity  Mild Exercises Skilled Activity  Stress Management Skilled Activity  Coping Skilled Activity  Leisure Education and Awareness Skilled Activity    Treatment Goal(s):  Long Term Goals Refer To Master Treatment Plan    Short Term Treatment Goal(s)  Patient Will:  Exhibit Improvement in Mood  Demonstrate Constructive Expression of Feelings and Behavior  Identify at Least 2 Coping Skills or Leisure Skills to Reduce Depression and Hopelessness Upon Request from Therapist    Discharge Recommendations:  Encourage Patient to Actively Utilize " Available Community Resources to Increase Leisure Involvement to Decrease Signs and Symptoms of Illness  Encourage Patient to Utilize Coping Skills on a Regular Basis to Reduce the Risk of Decompensating and Re-Hospitalizations  Follow Up with After Care Appointments  Continue with Current Leisure Activities

## 2019-06-18 NOTE — PSYCH
Patient refused to give permission to contact her spouse Laith Moura or her sister Dhara Corado. Patient stated that there is no one else she feels comfortable allowing this counselor to contact for a collateral contact. Patient stated that she considers herself a reliable self-informant at this time and she will contact Laith Moura or Dhara Corado on her own once she leaves this facility.
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
## 2019-06-18 NOTE — PLAN OF CARE
Problem: Adult Behavioral Health Plan of Care  Goal: Optimized Coping Skills in Response to Life Stressors    Intervention: Promote Effective Coping Strategies  Group Note        Behavior    Patient attended group psychotherapy today. Patient continued to exhibit depressed mood with appropriate affect.         Intervention     CBT-based group psychotherapy with focused attention on internal coping strategies as well as external coping strategies.         Response    Patient stated that she is learning to accept herself and to love her situations and problems. Patient stated that she is learning to convert herself with each problem and look at it as an opportunity for helping herself grow from the experience, no matter how negative it may be, and to keep in mind her role as a person who wants to set a good example to others by her she responds to sufferings that she faces.         Plan    Patient will be encouraged to continue to attend group psychotherapy with focused attention on continued work with internal and external coping strategies.

## 2019-06-18 NOTE — PLAN OF CARE
Problem: Adult Behavioral Health Plan of Care  Goal: Plan of Care Review  Outcome: Ongoing (interventions implemented as appropriate)  Pt calm and cooperative, out on unit, med compliant, interacting with staff and peers, appetite good, safety precautions maintained, will continue to monitior

## 2019-06-19 LAB — GLUCOSE SERPL-MCNC: 91 MG/DL (ref 70–110)

## 2019-06-19 PROCEDURE — 99233 PR SUBSEQUENT HOSPITAL CARE,LEVL III: ICD-10-PCS | Mod: AF,HB,S$PBB, | Performed by: PSYCHIATRY & NEUROLOGY

## 2019-06-19 PROCEDURE — 25000003 PHARM REV CODE 250: Performed by: PSYCHIATRY & NEUROLOGY

## 2019-06-19 PROCEDURE — 82947 ASSAY GLUCOSE BLOOD QUANT: CPT

## 2019-06-19 PROCEDURE — 99233 SBSQ HOSP IP/OBS HIGH 50: CPT | Mod: AF,HB,S$PBB, | Performed by: PSYCHIATRY & NEUROLOGY

## 2019-06-19 PROCEDURE — 36415 COLL VENOUS BLD VENIPUNCTURE: CPT

## 2019-06-19 PROCEDURE — 11400000 HC PSYCH PRIVATE ROOM

## 2019-06-19 RX ORDER — QUETIAPINE FUMARATE 25 MG/1
50 TABLET, FILM COATED ORAL NIGHTLY
Status: DISCONTINUED | OUTPATIENT
Start: 2019-06-19 | End: 2019-06-20 | Stop reason: HOSPADM

## 2019-06-19 RX ADMIN — THERA TABS 1 TABLET: TAB at 08:06

## 2019-06-19 RX ADMIN — MIRTAZAPINE 15 MG: 15 TABLET, ORALLY DISINTEGRATING ORAL at 08:06

## 2019-06-19 RX ADMIN — FOLIC ACID 1 MG: 1 TABLET ORAL at 08:06

## 2019-06-19 RX ADMIN — QUETIAPINE FUMARATE 50 MG: 25 TABLET ORAL at 08:06

## 2019-06-19 NOTE — NURSING
Pt resting quietly in assigned room.  Respirations even and unlabored.  No acute distress noted.  Q 15min checks maintained during shift.  Slept 7.5 hours.

## 2019-06-19 NOTE — PLAN OF CARE
Problem: Adult Behavioral Health Plan of Care  Goal: Optimized Coping Skills in Response to Life Stressors    Intervention: Promote Effective Coping Strategies  Group Note          Behavior    Patient attended group psychotherapy today. Patient exhibited depressed mood with appropriate affect.       Intervention     CBT-based group psychotherapy reviewed with the patients the one thing that is most important to them and worth living for; patients were encouraged to state how solitude may be able to help them take their mind off their problems without contacting another person.         Response    Patient stated that she believes solitude may be able to help her show some compassion towards herself and others. Patient stated that sometimes she agrees that when she is too hard on herself and others that is when the trouble begins for her. Patient appeared to have above average intelligence and has gained continued benefit from exploring internal coping strategies.         Plan    Patient will be encouraged to continue to attend group psychotherapy with focused attention on continued work in the area of coping strategies.

## 2019-06-19 NOTE — PLAN OF CARE
Problem: Adult Behavioral Health Plan of Care  Goal: Plan of Care Review  Outcome: Ongoing (interventions implemented as appropriate)  Care assumed.  Pt out of room in dayroom interacting appropriately with select peers.  Calm and cooperative.  Accepting meds without difficulty.  Denies SI at present assessment, no self harming behavior noted.  Denies needs at present check, but states does have a history of back issues.  Will continue to monitor for acute changes.

## 2019-06-19 NOTE — PROGRESS NOTES
"   06/19/19 7968   [facility] Group Therapy   Group Name Leisure Education   Specific Interventions Coping Skills Training  (defining leisure and benefits)   Participation Level Active;Sharing   Participation Quality Cooperative   Insight/Motivation Applies New Skills;Good   Affect/Mood Display Appropriate   Cognition Alert   Psychomotor WNL   Patient states leisure time is important "because you sometimes need time to do things you like." Patient states if she does not incorporate leisure time in her life "I get lost, confused or unhappy." Patient identified 3 most enjoyed leisure activities "play with my dog, watch TV, take time to myself."  "

## 2019-06-19 NOTE — PLAN OF CARE
Problem: Adult Behavioral Health Plan of Care  Goal: Adheres to Safety Considerations for Self and Others  Outcome: Ongoing (interventions implemented as appropriate)  Shift note : patient is cooperative . She is attending all groups and is taking all ordered medications . States that she is feeling better but continues to have a blunted affect .
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
## 2019-06-20 VITALS
TEMPERATURE: 98 F | HEIGHT: 70 IN | HEART RATE: 95 BPM | RESPIRATION RATE: 16 BRPM | DIASTOLIC BLOOD PRESSURE: 66 MMHG | WEIGHT: 163.94 LBS | SYSTOLIC BLOOD PRESSURE: 113 MMHG | BODY MASS INDEX: 23.47 KG/M2

## 2019-06-20 PROBLEM — F33.2 SEVERE EPISODE OF RECURRENT MAJOR DEPRESSIVE DISORDER, WITHOUT PSYCHOTIC FEATURES: Status: ACTIVE | Noted: 2019-06-16

## 2019-06-20 PROCEDURE — 25000003 PHARM REV CODE 250: Performed by: PSYCHIATRY & NEUROLOGY

## 2019-06-20 PROCEDURE — 99239 PR HOSPITAL DISCHARGE DAY,>30 MIN: ICD-10-PCS | Mod: AF,HB,S$PBB, | Performed by: PSYCHIATRY & NEUROLOGY

## 2019-06-20 PROCEDURE — 99239 HOSP IP/OBS DSCHRG MGMT >30: CPT | Mod: AF,HB,S$PBB, | Performed by: PSYCHIATRY & NEUROLOGY

## 2019-06-20 RX ORDER — MIRTAZAPINE 15 MG/1
15 TABLET, FILM COATED ORAL NIGHTLY
Qty: 30 TABLET | Refills: 1 | Status: SHIPPED | OUTPATIENT
Start: 2019-06-20 | End: 2019-08-19

## 2019-06-20 RX ORDER — QUETIAPINE FUMARATE 50 MG/1
50 TABLET, FILM COATED ORAL NIGHTLY
Qty: 30 TABLET | Refills: 1 | Status: SHIPPED | OUTPATIENT
Start: 2019-06-20 | End: 2019-08-19

## 2019-06-20 RX ORDER — MELOXICAM 7.5 MG/1
7.5 TABLET ORAL DAILY
Qty: 30 TABLET | Refills: 1 | OUTPATIENT
Start: 2019-06-20 | End: 2022-08-03

## 2019-06-20 RX ADMIN — FOLIC ACID 1 MG: 1 TABLET ORAL at 08:06

## 2019-06-20 RX ADMIN — THERA TABS 1 TABLET: TAB at 08:06

## 2019-06-20 NOTE — PSYCH
Called [employer] and set up ride for patient at 3:08 pm.  There is a 3 hour window for pickup.  Conformation number is [id number].

## 2019-06-20 NOTE — PSYCH
Cancelled Medicaid ride number [id number] at 3:25 pm on 6/20/2019.  Spoke to Randi and she cancelled ride with ride company while writer was on hold.

## 2019-06-20 NOTE — DISCHARGE SUMMARY
Discharge Summary  Psychiatry    Admit Date: 6/16/2019    Discharge Date and Time:  06/20/2019 9:31 AM    Attending Physician: Jabari Blair MD     Discharge Provider: Jabari Blair    Reason for Admission:  Suicidal ideation    History of Present Illness:   She is treated for depression anxiety by PCP with seroquel 25mg three times per day.  She has been on an antidepressant before but she felt like it made her more suicidal.  She has singificant psychosocial stressors including marital and financial.  Her aunt passed way on Tuesday from cancer.  For the past year she has been depressed to the point of not eating or bathing, just staying at home doing nothing.           Endorses Symptoms of Depression: +diminished mood or loss of +interest/anhedonia irritability, +diminished energy, change in sleep, +change in appetite, +diminished concentration or cognition or indecisiveness, PMA/R, +excessive guilt or hopelessness or worthlessness, +suicidal ideations     Endorses Changes in Sleep: +trouble with initiation, maintenance, early morning awakening with inability to return to sleep, hypersomnolence      Endorses Suicidal/Homicidal ideations: +active/passive ideations, +organized plans, future intentions     Active plan to cut self to the point of death     No psychosis or dl     Endorses Symptoms of TIAN: all of the following excessive anxiety/worry/fear, more days than not, about numerous issues, difficult to control, with restlessness, fatigue, poor concentration, irritability, muscle tension, sleep disturbance; causes functionally impairing distress      Endorses Symptoms of Panic Disorder: +recurrent panic attacks (palpitations/heart racing, sweating, shakiness, dyspnea, choking, chest pain/discomfort, Gi symptoms, dizzy/lightheadedness, hot/col flashes, paresthesias, derealization, fear of losing control or fear of dying), precipitated or un-precipitated, +source of worry and/or behavioral changes  secondary, +with or without agoraphobia     She has had some syncope and wore a halter monitor for 21 days, awaiting the results from that     Endorses Symptoms of PTSD: +h/o trauma; +re-experiencing/intrusive symptoms, avoidant behavior, +negative alterations in cognition or mood, +hyperarousal symptoms; with or without dissociative symptoms      Past diagnosis of anorexia because of ptsd trauma     Endorses Substance Use: +Tolerance, Withdrawal, Loss of control, Social / Occupational Consequence,  Risky / Continued Use, State of Change (pre-contemplative, +contemplative, preparation, action, maintenance, termination)    Procedures Performed: * No surgery found *    Hospital Course (synopsis of major diagnoses, care, treatment, and services provided during the course of the hospital stay):   The patient was stabilized and discharged on the following medications:  Mirtazapine 15mg qhs for depression  Seroquel 50mg qhs for depression     The patient was compliant with treatment. The patient denied any side effects.     Patient did well on unit, suicidality resolved.  She slept and read, participated well in therapy.      Discussed diagnosis, risks and benefits of proposed treatment vs alternative treatments vs no treatment, and potential side effects of these treatments.  The patient expresses understanding of the above and displays the capacity to agree with this treatment given said understanding.  Patient also agrees that, currently, the benefits outweigh the risks and would like to pursue treatment at this time.    MSE: stated age, casually dressed, well groomed.  No psychomotor agitation or retardation.  No abnormal involuntary movements.  Gait normal.  Speech normal, conversational.  Language fluent English. Mood fine.  Affect normal range, pleasant, euthymic.  Thought process linear.  Associations intact.  Denies suicidal or homicidal ideation.  Denies auditory hallucinations, paranoid ideation, ideas of  reference.  Memory intact.  Attention intact.  Fund of knowledge intact.  Insight intact.  Judgment intact.  Alert and oriented to person, place, time.      Tobacco Usage:  Is patient a smoker? No  Does patient want prescription for Tobacco Cessation? No  Does patient want counseling for Tobacco Cessation? No    If patient would like to quit, then over the counter nicotine patch could be used. The patient could also follow up with his PCP or psychiatric provider for other alternatives.     Final Diagnoses:    Principal Problem: Major Depressive Disorder Severe Recurrent without psychosis   Secondary Diagnoses:   Major Depressive Disorder Severe Recurrent without psychosis  TIAN  Panic with agoraphobia  PTSD     Chronic back pain    Labs:  Admission on 06/16/2019   Component Date Value Ref Range Status    Cholesterol 06/16/2019 148  120 - 199 mg/dL Final    Triglycerides 06/16/2019 44  30 - 150 mg/dL Final    HDL 06/16/2019 39* 40 - 75 mg/dL Final    LDL Cholesterol 06/16/2019 100.2  63.0 - 159.0 mg/dL Final    Hdl/Cholesterol Ratio 06/16/2019 26.4  20.0 - 50.0 % Final    Total Cholesterol/HDL Ratio 06/16/2019 3.8  2.0 - 5.0 Final    Non-HDL Cholesterol 06/16/2019 109  mg/dL Final    Glucose, Fasting 06/19/2019 91  70 - 110 mg/dL Final   Admission on 06/16/2019, Discharged on 06/16/2019   Component Date Value Ref Range Status    Sodium 06/16/2019 138  136 - 145 mmol/L Final    Potassium 06/16/2019 3.1* 3.5 - 5.1 mmol/L Final    Chloride 06/16/2019 104  95 - 110 mmol/L Final    CO2 06/16/2019 23  23 - 29 mmol/L Final    Glucose 06/16/2019 102  70 - 110 mg/dL Final    BUN, Bld 06/16/2019 7  6 - 20 mg/dL Final    Creatinine 06/16/2019 0.8  0.5 - 1.4 mg/dL Final    Calcium 06/16/2019 9.7  8.7 - 10.5 mg/dL Final    Total Protein 06/16/2019 8.8* 6.0 - 8.4 g/dL Final    Albumin 06/16/2019 4.1  3.5 - 5.2 g/dL Final    Total Bilirubin 06/16/2019 0.3  0.1 - 1.0 mg/dL Final    Alkaline Phosphatase 06/16/2019  93  55 - 135 U/L Final    AST 06/16/2019 11  10 - 40 U/L Final    ALT 06/16/2019 5* 10 - 44 U/L Final    Anion Gap 06/16/2019 11  8 - 16 mmol/L Final    eGFR if African American 06/16/2019 >60  >60 mL/min/1.73 m^2 Final    eGFR if non African American 06/16/2019 >60  >60 mL/min/1.73 m^2 Final    WBC 06/16/2019 5.55  3.90 - 12.70 K/uL Final    RBC 06/16/2019 5.15  4.00 - 5.40 M/uL Final    Hemoglobin 06/16/2019 12.1  12.0 - 16.0 g/dL Final    Hematocrit 06/16/2019 37.2  37.0 - 48.5 % Final    Mean Corpuscular Volume 06/16/2019 72* 82 - 98 fL Final    Mean Corpuscular Hemoglobin 06/16/2019 23.5* 27.0 - 31.0 pg Final    Mean Corpuscular Hemoglobin Conc 06/16/2019 32.5  32.0 - 36.0 g/dL Final    RDW 06/16/2019 15.1* 11.5 - 14.5 % Final    Platelets 06/16/2019 335  150 - 350 K/uL Final    MPV 06/16/2019 10.3  9.2 - 12.9 fL Final    Gran # (ANC) 06/16/2019 3.8  1.8 - 7.7 K/uL Final    Lymph # 06/16/2019 1.1  1.0 - 4.8 K/uL Final    Mono # 06/16/2019 0.7  0.3 - 1.0 K/uL Final    Eos # 06/16/2019 0.0  0.0 - 0.5 K/uL Final    Baso # 06/16/2019 0.01  0.00 - 0.20 K/uL Final    Gran% 06/16/2019 67.5  38.0 - 73.0 % Final    Lymph% 06/16/2019 19.5  18.0 - 48.0 % Final    Mono% 06/16/2019 12.8  4.0 - 15.0 % Final    Eosinophil% 06/16/2019 0.0  0.0 - 8.0 % Final    Basophil% 06/16/2019 0.2  0.0 - 1.9 % Final    Differential Method 06/16/2019 Automated   Final    Troponin I 06/16/2019 <0.006  0.000 - 0.026 ng/mL Final    CPK 06/16/2019 105  20 - 180 U/L Final    CPK MB 06/16/2019 1.4  0.1 - 6.5 ng/mL Final    MB% 06/16/2019 1.3  0.0 - 5.0 % Final    CPK 06/16/2019 105  20 - 180 U/L Final    BNP 06/16/2019 50  0 - 99 pg/mL Final    Prothrombin Time 06/16/2019 10.9  9.0 - 12.5 sec Final    INR 06/16/2019 1.0  0.8 - 1.2 Final    aPTT 06/16/2019 32.9* 21.0 - 32.0 sec Final    D-Dimer 06/16/2019 0.26  <0.50 mg/L FEU Final    TSH 06/16/2019 3.184  0.400 - 4.000 uIU/mL Final    Magnesium 06/16/2019 2.0   1.6 - 2.6 mg/dL Final    Phosphorus 06/16/2019 2.8  2.7 - 4.5 mg/dL Final    Specimen UA 06/16/2019 Urine, Clean Catch   Final    Color, UA 06/16/2019 Yellow  Yellow, Straw, Rowena Final    Appearance, UA 06/16/2019 Clear  Clear Final    pH, UA 06/16/2019 6.0  5.0 - 8.0 Final    Specific Gravity, UA 06/16/2019 1.020  1.005 - 1.030 Final    Protein, UA 06/16/2019 Negative  Negative Final    Glucose, UA 06/16/2019 Negative  Negative Final    Ketones, UA 06/16/2019 1+* Negative Final    Bilirubin (UA) 06/16/2019 Negative  Negative Final    Occult Blood UA 06/16/2019 Negative  Negative Final    Nitrite, UA 06/16/2019 Negative  Negative Final    Urobilinogen, UA 06/16/2019 Negative  <2.0 EU/dL Final    Leukocytes, UA 06/16/2019 Negative  Negative Final    Preg Test, Ur 06/16/2019 Negative   Final    Benzodiazepines 06/16/2019 Negative   Final    Methadone metabolites 06/16/2019 Negative   Final    Cocaine (Metab.) 06/16/2019 Negative   Final    Opiate Scrn, Ur 06/16/2019 Negative   Final    Barbiturate Screen, Ur 06/16/2019 Negative   Final    Amphetamine Screen, Ur 06/16/2019 Presumptive Positive   Final    THC 06/16/2019 Presumptive Positive   Final    Phencyclidine 06/16/2019 Negative   Final    Creatinine, Random Ur 06/16/2019 176.9  15.0 - 325.0 mg/dL Final    Toxicology Information 06/16/2019 SEE COMMENT   Final    Alcohol, Medical, Serum 06/16/2019 <10  <10 mg/dL Final    Salicylate Lvl 06/16/2019 <5.0* 15.0 - 30.0 mg/dL Final    Acetaminophen (Tylenol), Serum 06/16/2019 <3.0* 10.0 - 20.0 ug/mL Final         Discharged Condition: stable and improved; not currently a danger to self/others or gravely disabled    Disposition: Home or Self Care    Is patient being discharged on multiple neuroleptics? No    Follow Up/Patient Instructions:     Medications:  Reconciled Home Medications:      Medication List      START taking these medications    mirtazapine 15 MG tablet  Commonly known as:   REMERON  Take 1 tablet (15 mg total) by mouth every evening.        CHANGE how you take these medications    QUEtiapine 50 MG tablet  Commonly known as:  SEROQUEL  Take 1 tablet (50 mg total) by mouth every evening.  What changed:    · medication strength  · how much to take  · when to take this        STOP taking these medications    cyproheptadine 4 mg tablet  Commonly known as:  PERIACTIN     diphenhydrAMINE 50 MG capsule  Commonly known as:  BENADRYL     methocarbamol 750 MG Tab  Commonly known as:  ROBAXIN          Discharge Procedure Orders   Diet Adult Regular     Notify your health care provider if you experience any of the following:   Order Comments: Suicidal ideation     Activity as tolerated           Diet: regular     Activity as tolerated    Total time spent discharging patient: 32 minutes    Jabari Blair MD  Psychiatry

## 2019-06-20 NOTE — PROGRESS NOTES
"Patient continues to state that she does not have a substance abuse problem; wants help for her depression. Patient's depression is aggravated by her chronic back pain.     Patient stated, "it does not matter what type of medications I get because it only helps for a little while and they the pain comes back real bad."     Patient stated that the pain extends from the lower back to the left hip.       Plan    Patient will be prepared for discharge.   "

## 2019-06-20 NOTE — PLAN OF CARE
Problem: Adult Behavioral Health Plan of Care  Goal: Plan of Care Review  Outcome: Ongoing (interventions implemented as appropriate)  Plan of care reviewed with patient, patient agrees with plan. Denies suicidal ideations and homicidal ideations. Patient is calm and cooperative. States is ready to go home. Compliant with medication. Accepts meals and snacks. Gait steady, no falls. Clear pathways and clutter-free environment maintained. Promoted an individualized safety plan, effective coping skills,  ability to think and concentrate, and motivators to improve mood and resolve depression. Will continue to monitor for safety.

## 2019-06-20 NOTE — PSYCH
Patient will be following up with [facility] at [address] [place] [address] in [place], [phone].  Appointment is on 6/25/2019 at 8 am.  Patient does not use tobacco products but will be receiving addictions counseling due to a positive UDS on admit.  AVS faxed on 6/20/2019 at 11:03 am.

## 2019-06-20 NOTE — PLAN OF CARE
Problem: Adult Behavioral Health Plan of Care  Goal: Plan of Care Review  Outcome: Ongoing (interventions implemented as appropriate)  Received pt. In dayroom at start of the shift. Mood and affect improved, pleasant, smiling. Pt. States she is ready for discharge and plans on during f/u [facility]. Denies self harm thoughts, motivated in her cont. tx plan.Pt.  Slept 6.5  Hours  so far this shift without problems noted. q 15 min safety checks done this shift. Safety and comfort maintained. Cont. Plan.

## 2019-06-20 NOTE — PROGRESS NOTES
"   06/20/19 1040   [facility] Group Therapy   Group Name Stress Management   Specific Interventions Skilled Activity Stress Management   Participation Level Active;Appropriate   Participation Quality Cooperative   Insight/Motivation Applies New Skills;Good   Affect/Mood Display Appropriate   Cognition Alert   Psychomotor WNL   Patient states stress to her means "a lot of things going on in my life and I can't control it, no matter how hard I try." Patient shared francy that increase her stress: not being able to speak openly about my feelings when angry or worried and not organizing my time effectively. Patient  States "I want to love myself for who I am with my condition. I just want to be loved back."  "

## 2019-06-20 NOTE — PSYCH
Patient was given a copy of her safety plan; she was also given a copy of her patient safety plan narrative. Patient stated that she will call someone on her own to pick her up when she is discharged. Patient feels she needs an escort.       Plan    Patient will be prepared for discharge.

## 2019-06-20 NOTE — PSYCH
Patient was on the telephone attempting to make her own arrangements to go home. Patient will be asked if she needed assistance if she can not accomplish this task that she requested to do herself. Patient was excused from group psychotherapy today.

## 2019-07-15 ENCOUNTER — TELEPHONE (OUTPATIENT)
Dept: NEUROSURGERY | Facility: CLINIC | Age: 44
End: 2019-07-15

## 2019-07-15 NOTE — TELEPHONE ENCOUNTER
Provided pt with the number to [facility] ([phone]) so she can inquire about out of pocket expenses for inj.  V/U.

## 2019-08-02 ENCOUNTER — TELEPHONE (OUTPATIENT)
Dept: NEUROSURGERY | Facility: CLINIC | Age: 44
End: 2019-08-02

## 2019-08-02 NOTE — TELEPHONE ENCOUNTER
Attempted to reach pt by returning her call to discuss questions about PT referrals and insurance.  DENISE.

## 2019-08-08 ENCOUNTER — HOSPITAL ENCOUNTER (OUTPATIENT)
Dept: RADIOLOGY | Facility: HOSPITAL | Age: 44
Discharge: HOME | End: 2019-08-08
Attending: INTERNAL MEDICINE
Payer: MEDICAID

## 2019-08-08 DIAGNOSIS — M54.50 LOW BACK PAIN: ICD-10-CM

## 2019-08-08 DIAGNOSIS — M25.552 PAIN IN JOINT OF LEFT HIP: ICD-10-CM

## 2019-08-08 DIAGNOSIS — M25.552 PAIN IN JOINT OF LEFT HIP: Primary | ICD-10-CM

## 2019-08-08 PROCEDURE — 73502 XR HIP 2 VIEW LEFT: ICD-10-PCS | Mod: 26,LT,, | Performed by: RADIOLOGY

## 2019-08-08 PROCEDURE — 72100 XR LUMBAR SPINE 2 OR 3 VIEWS: ICD-10-PCS | Mod: 26,,, | Performed by: RADIOLOGY

## 2019-08-08 PROCEDURE — 73502 X-RAY EXAM HIP UNI 2-3 VIEWS: CPT | Mod: TC,LT

## 2019-08-08 PROCEDURE — 73502 X-RAY EXAM HIP UNI 2-3 VIEWS: CPT | Mod: 26,LT,, | Performed by: RADIOLOGY

## 2019-08-08 PROCEDURE — 72100 X-RAY EXAM L-S SPINE 2/3 VWS: CPT | Mod: TC

## 2019-08-08 PROCEDURE — 72100 X-RAY EXAM L-S SPINE 2/3 VWS: CPT | Mod: 26,,, | Performed by: RADIOLOGY

## 2019-08-30 ENCOUNTER — HOSPITAL ENCOUNTER (EMERGENCY)
Facility: HOSPITAL | Age: 44
Discharge: HOME | End: 2019-08-30
Attending: EMERGENCY MEDICINE
Payer: MEDICAID

## 2019-08-30 VITALS
TEMPERATURE: 98 F | RESPIRATION RATE: 20 BRPM | OXYGEN SATURATION: 98 % | HEART RATE: 80 BPM | SYSTOLIC BLOOD PRESSURE: 130 MMHG | WEIGHT: 183.44 LBS | DIASTOLIC BLOOD PRESSURE: 80 MMHG | BODY MASS INDEX: 26.32 KG/M2

## 2019-08-30 DIAGNOSIS — M77.8 TENDINITIS OF RIGHT WRIST: Primary | ICD-10-CM

## 2019-08-30 PROCEDURE — 99284 EMERGENCY DEPT VISIT MOD MDM: CPT | Mod: 25

## 2019-08-30 PROCEDURE — 96372 THER/PROPH/DIAG INJ SC/IM: CPT

## 2019-08-30 PROCEDURE — 63600175 PHARM REV CODE 636 W HCPCS: Performed by: EMERGENCY MEDICINE

## 2019-08-30 RX ORDER — KETOROLAC TROMETHAMINE 30 MG/ML
60 INJECTION, SOLUTION INTRAMUSCULAR; INTRAVENOUS
Status: COMPLETED | OUTPATIENT
Start: 2019-08-30 | End: 2019-08-30

## 2019-08-30 RX ORDER — KETOROLAC TROMETHAMINE 10 MG/1
10 TABLET, FILM COATED ORAL EVERY 6 HOURS
Qty: 20 TABLET | Refills: 0 | OUTPATIENT
Start: 2019-08-30 | End: 2020-06-29

## 2019-08-30 RX ORDER — PREDNISONE 50 MG/1
50 TABLET ORAL DAILY
Qty: 5 TABLET | Refills: 0 | Status: SHIPPED | OUTPATIENT
Start: 2019-08-30 | End: 2019-09-04

## 2019-08-30 RX ORDER — DEXAMETHASONE SODIUM PHOSPHATE 4 MG/ML
12 INJECTION, SOLUTION INTRA-ARTICULAR; INTRALESIONAL; INTRAMUSCULAR; INTRAVENOUS; SOFT TISSUE
Status: COMPLETED | OUTPATIENT
Start: 2019-08-30 | End: 2019-08-30

## 2019-08-30 RX ADMIN — KETOROLAC TROMETHAMINE 60 MG: 30 INJECTION, SOLUTION INTRAMUSCULAR at 11:08

## 2019-08-30 RX ADMIN — DEXAMETHASONE SODIUM PHOSPHATE 12 MG: 4 INJECTION, SOLUTION INTRAMUSCULAR; INTRAVENOUS at 11:08
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
## 2019-08-30 NOTE — ED TRIAGE NOTES
"Patient presents to the ER with c/o right wrist pain for a week.  Patient denies trauma.  Patient reports that pain comes and goes and sometimes "shoots up arm."    "

## 2020-06-29 ENCOUNTER — HOSPITAL ENCOUNTER (EMERGENCY)
Facility: HOSPITAL | Age: 45
Discharge: HOME | End: 2020-06-29
Attending: EMERGENCY MEDICINE
Payer: MEDICAID

## 2020-06-29 VITALS
OXYGEN SATURATION: 97 % | BODY MASS INDEX: 25.62 KG/M2 | SYSTOLIC BLOOD PRESSURE: 138 MMHG | DIASTOLIC BLOOD PRESSURE: 74 MMHG | TEMPERATURE: 99 F | RESPIRATION RATE: 16 BRPM | HEART RATE: 84 BPM | HEIGHT: 71 IN | WEIGHT: 183 LBS

## 2020-06-29 DIAGNOSIS — M54.42 LEFT-SIDED LOW BACK PAIN WITH LEFT-SIDED SCIATICA, UNSPECIFIED CHRONICITY: ICD-10-CM

## 2020-06-29 DIAGNOSIS — M25.552 LEFT HIP PAIN: ICD-10-CM

## 2020-06-29 DIAGNOSIS — L72.9 CYST OF BUTTOCKS: Primary | ICD-10-CM

## 2020-06-29 LAB
B-HCG UR QL: NEGATIVE
CTP QC/QA: YES

## 2020-06-29 PROCEDURE — 63600175 PHARM REV CODE 636 W HCPCS: Mod: ER | Performed by: PHYSICIAN ASSISTANT

## 2020-06-29 PROCEDURE — 99284 EMERGENCY DEPT VISIT MOD MDM: CPT | Mod: 25,ER

## 2020-06-29 PROCEDURE — 96372 THER/PROPH/DIAG INJ SC/IM: CPT | Mod: ER

## 2020-06-29 PROCEDURE — 81025 URINE PREGNANCY TEST: CPT | Mod: ER | Performed by: PHYSICIAN ASSISTANT

## 2020-06-29 RX ORDER — CYCLOBENZAPRINE HCL 10 MG
10 TABLET ORAL 3 TIMES DAILY PRN
Qty: 15 TABLET | Refills: 0 | Status: SHIPPED | OUTPATIENT
Start: 2020-06-29 | End: 2020-06-29 | Stop reason: SDUPTHER

## 2020-06-29 RX ORDER — LIDOCAINE 50 MG/G
1 PATCH TOPICAL DAILY
Qty: 15 PATCH | Refills: 0 | OUTPATIENT
Start: 2020-06-29 | End: 2022-08-03

## 2020-06-29 RX ORDER — ORPHENADRINE CITRATE 30 MG/ML
60 INJECTION INTRAMUSCULAR; INTRAVENOUS
Status: COMPLETED | OUTPATIENT
Start: 2020-06-29 | End: 2020-06-29

## 2020-06-29 RX ORDER — LIDOCAINE 50 MG/G
1 PATCH TOPICAL
Status: DISCONTINUED | OUTPATIENT
Start: 2020-06-29 | End: 2020-06-29

## 2020-06-29 RX ORDER — KETOROLAC TROMETHAMINE 10 MG/1
10 TABLET, FILM COATED ORAL EVERY 6 HOURS PRN
Qty: 10 TABLET | Refills: 0 | OUTPATIENT
Start: 2020-06-29 | End: 2022-08-03

## 2020-06-29 RX ORDER — KETOROLAC TROMETHAMINE 30 MG/ML
30 INJECTION, SOLUTION INTRAMUSCULAR; INTRAVENOUS
Status: COMPLETED | OUTPATIENT
Start: 2020-06-29 | End: 2020-06-29

## 2020-06-29 RX ORDER — CYCLOBENZAPRINE HCL 10 MG
10 TABLET ORAL 3 TIMES DAILY PRN
Qty: 15 TABLET | Refills: 0 | Status: SHIPPED | OUTPATIENT
Start: 2020-06-29 | End: 2020-07-04

## 2020-06-29 RX ADMIN — ORPHENADRINE CITRATE 60 MG: 60 INJECTION INTRAMUSCULAR; INTRAVENOUS at 12:06

## 2020-06-29 RX ADMIN — KETOROLAC TROMETHAMINE 30 MG: 30 INJECTION, SOLUTION INTRAMUSCULAR at 12:06

## 2020-06-29 NOTE — Clinical Note
Destiny Moura was seen and treated in our emergency department on 6/29/2020.  She may return to work on 06/30/2020.       If you have any questions or concerns, please don't hesitate to call.      Endy Pinto RN
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
## 2020-06-29 NOTE — DISCHARGE INSTRUCTIONS
Please take new medication as directed and follow discharge instructions provided.  Please make sure to follow-up with your PCP and General surgery to discuss today's emergency department visit and for further evaluation and management.  Please return to the emergency department immediately if your symptoms worsen or you develop any additional concerning symptoms.

## 2021-04-16 ENCOUNTER — PATIENT MESSAGE (OUTPATIENT)
Dept: RESEARCH | Facility: HOSPITAL | Age: 46
End: 2021-04-16

## 2021-04-27 NOTE — ED NOTES
Diet: Patient instructed regarding a heart healthy diet, including discussion of reduced fat and sodium intake. Patient demonstrated understanding of this information and agreed to call with further questions or concerns.  Labs: Patient was given results of the laboratory testing obtained today. Patient was instructed to return for the next laboratory testing on Thursday 4/29 . Patient demonstrated understanding of this information and agreed to call with further questions or concerns.   Return Appointment: Patient given instructions regarding scheduling next clinic visit. Patient demonstrated understanding of this information and agreed to call with further questions or concerns.  Medication Change: Patient was educated regarding prescribed medication change, including discussion of the indication, administration, side effects, and when to report to MD or RN. Patient demonstrated understanding of this information and agreed to call with further questions or concerns.  Patient stated she understood all health information given and agreed to call with further questions or concerns.    The patient is awake, alert and cooperative with a calm affect, patient is aware of environment. Airway is open and patent, respirations are spontaneous, normal respiratory effort and rate noted, skin warm and dry, full ROM in all extremities, appearance: no apparent distress noted, resting comfortably.  VSS, no change from previous assessment.  Bed in low, locked position, SR x2, HOB 30 degrees.  Pt able to change position independently.  Call bell within reach of pt.  Pt verbalizes understanding of use.  Will continue to monitor.

## 2022-08-02 ENCOUNTER — HOSPITAL ENCOUNTER (EMERGENCY)
Facility: HOSPITAL | Age: 47
Discharge: LEFT BEFORE DISCHARGE | End: 2022-08-02
Attending: SURGERY
Payer: MEDICAID

## 2022-08-02 VITALS
TEMPERATURE: 99 F | DIASTOLIC BLOOD PRESSURE: 82 MMHG | HEART RATE: 101 BPM | OXYGEN SATURATION: 99 % | WEIGHT: 192.81 LBS | SYSTOLIC BLOOD PRESSURE: 133 MMHG | BODY MASS INDEX: 26.89 KG/M2 | RESPIRATION RATE: 18 BRPM

## 2022-08-02 DIAGNOSIS — R10.9 LEFT FLANK PAIN: Primary | ICD-10-CM

## 2022-08-02 LAB
B-HCG UR QL: NEGATIVE
BILIRUB UR QL STRIP: ABNORMAL
CLARITY UR: CLEAR
COLOR UR: YELLOW
GLUCOSE UR QL STRIP: NEGATIVE
HGB UR QL STRIP: NEGATIVE
KETONES UR QL STRIP: ABNORMAL
LEUKOCYTE ESTERASE UR QL STRIP: NEGATIVE
NITRITE UR QL STRIP: NEGATIVE
PH UR STRIP: 7 [PH] (ref 5–8)
PROT UR QL STRIP: NEGATIVE
SP GR UR STRIP: >=1.03 (ref 1–1.03)
URN SPEC COLLECT METH UR: ABNORMAL
UROBILINOGEN UR STRIP-ACNC: NEGATIVE EU/DL

## 2022-08-02 PROCEDURE — 81025 URINE PREGNANCY TEST: CPT | Performed by: STUDENT IN AN ORGANIZED HEALTH CARE EDUCATION/TRAINING PROGRAM

## 2022-08-02 PROCEDURE — 96372 THER/PROPH/DIAG INJ SC/IM: CPT | Performed by: STUDENT IN AN ORGANIZED HEALTH CARE EDUCATION/TRAINING PROGRAM

## 2022-08-02 PROCEDURE — 63600175 PHARM REV CODE 636 W HCPCS: Performed by: STUDENT IN AN ORGANIZED HEALTH CARE EDUCATION/TRAINING PROGRAM

## 2022-08-02 PROCEDURE — 25000003 PHARM REV CODE 250: Performed by: STUDENT IN AN ORGANIZED HEALTH CARE EDUCATION/TRAINING PROGRAM

## 2022-08-02 PROCEDURE — 99900041 HC LEFT WITHOUT BEING SEEN- EMERGENCY

## 2022-08-02 PROCEDURE — 99284 EMERGENCY DEPT VISIT MOD MDM: CPT

## 2022-08-02 PROCEDURE — 81003 URINALYSIS AUTO W/O SCOPE: CPT | Performed by: STUDENT IN AN ORGANIZED HEALTH CARE EDUCATION/TRAINING PROGRAM

## 2022-08-02 RX ORDER — KETOROLAC TROMETHAMINE 30 MG/ML
15 INJECTION, SOLUTION INTRAMUSCULAR; INTRAVENOUS
Status: COMPLETED | OUTPATIENT
Start: 2022-08-02 | End: 2022-08-02

## 2022-08-02 RX ORDER — ONDANSETRON 4 MG/1
4 TABLET, ORALLY DISINTEGRATING ORAL
Status: COMPLETED | OUTPATIENT
Start: 2022-08-02 | End: 2022-08-02

## 2022-08-02 RX ORDER — ACETAMINOPHEN 500 MG
1000 TABLET ORAL
Status: COMPLETED | OUTPATIENT
Start: 2022-08-02 | End: 2022-08-02

## 2022-08-02 RX ADMIN — ACETAMINOPHEN 1000 MG: 500 TABLET ORAL at 06:08

## 2022-08-02 RX ADMIN — ONDANSETRON 4 MG: 4 TABLET, ORALLY DISINTEGRATING ORAL at 06:08

## 2022-08-02 RX ADMIN — KETOROLAC TROMETHAMINE 15 MG: 30 INJECTION, SOLUTION INTRAMUSCULAR at 06:08
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
## 2022-08-03 ENCOUNTER — HOSPITAL ENCOUNTER (EMERGENCY)
Facility: HOSPITAL | Age: 47
Discharge: HOME | End: 2022-08-03
Attending: EMERGENCY MEDICINE
Payer: MEDICAID

## 2022-08-03 VITALS
OXYGEN SATURATION: 98 % | BODY MASS INDEX: 28.14 KG/M2 | SYSTOLIC BLOOD PRESSURE: 121 MMHG | HEART RATE: 97 BPM | DIASTOLIC BLOOD PRESSURE: 87 MMHG | RESPIRATION RATE: 14 BRPM | TEMPERATURE: 99 F | HEIGHT: 69 IN | WEIGHT: 190 LBS

## 2022-08-03 DIAGNOSIS — N83.202 LEFT OVARIAN CYST: Primary | ICD-10-CM

## 2022-08-03 LAB
ALBUMIN SERPL-MCNC: 4.1 G/DL (ref 3.3–5.5)
ALP SERPL-CCNC: 89 U/L (ref 42–141)
B-HCG UR QL: NEGATIVE
BILIRUB SERPL-MCNC: 0.6 MG/DL (ref 0.2–1.6)
BILIRUBIN, POC UA: ABNORMAL
BLOOD, POC UA: NEGATIVE
BUN SERPL-MCNC: 8 MG/DL (ref 7–22)
CALCIUM SERPL-MCNC: 9.9 MG/DL (ref 8–10.3)
CHLORIDE SERPL-SCNC: 104 MMOL/L (ref 98–108)
CLARITY, POC UA: CLEAR
COLOR, POC UA: ABNORMAL
CREAT SERPL-MCNC: 0.8 MG/DL (ref 0.6–1.2)
CTP QC/QA: YES
GLUCOSE SERPL-MCNC: 99 MG/DL (ref 73–118)
GLUCOSE, POC UA: NEGATIVE
KETONES, POC UA: ABNORMAL
LEUKOCYTE EST, POC UA: NEGATIVE
NITRITE, POC UA: NEGATIVE
PH UR STRIP: 5.5 [PH]
POC ALT (SGPT): 14 U/L (ref 10–47)
POC AST (SGOT): 23 U/L (ref 11–38)
POC TCO2: 25 MMOL/L (ref 18–33)
POTASSIUM BLD-SCNC: 3.8 MMOL/L (ref 3.6–5.1)
PROTEIN, POC UA: ABNORMAL
PROTEIN, POC: 8.7 G/DL (ref 6.4–8.1)
SODIUM BLD-SCNC: 139 MMOL/L (ref 128–145)
SPECIFIC GRAVITY, POC UA: >=1.03
UROBILINOGEN, POC UA: 0.2 E.U./DL

## 2022-08-03 PROCEDURE — 99285 EMERGENCY DEPT VISIT HI MDM: CPT | Mod: 25,ER

## 2022-08-03 PROCEDURE — 96361 HYDRATE IV INFUSION ADD-ON: CPT | Mod: ER

## 2022-08-03 PROCEDURE — 85025 COMPLETE CBC W/AUTO DIFF WBC: CPT | Mod: ER

## 2022-08-03 PROCEDURE — 25500020 PHARM REV CODE 255: Mod: ER

## 2022-08-03 PROCEDURE — 96374 THER/PROPH/DIAG INJ IV PUSH: CPT | Mod: ER

## 2022-08-03 PROCEDURE — 25000003 PHARM REV CODE 250: Mod: ER | Performed by: NURSE PRACTITIONER

## 2022-08-03 PROCEDURE — 63600175 PHARM REV CODE 636 W HCPCS: Mod: ER | Performed by: NURSE PRACTITIONER

## 2022-08-03 PROCEDURE — 80053 COMPREHEN METABOLIC PANEL: CPT | Mod: ER

## 2022-08-03 PROCEDURE — 81025 URINE PREGNANCY TEST: CPT | Mod: ER | Performed by: NURSE PRACTITIONER

## 2022-08-03 PROCEDURE — 81003 URINALYSIS AUTO W/O SCOPE: CPT | Mod: ER

## 2022-08-03 RX ORDER — KETOROLAC TROMETHAMINE 30 MG/ML
15 INJECTION, SOLUTION INTRAMUSCULAR; INTRAVENOUS
Status: COMPLETED | OUTPATIENT
Start: 2022-08-03 | End: 2022-08-03

## 2022-08-03 RX ORDER — NAPROXEN 500 MG/1
500 TABLET ORAL 2 TIMES DAILY WITH MEALS
Qty: 14 TABLET | Refills: 0 | Status: SHIPPED | OUTPATIENT
Start: 2022-08-03 | End: 2022-08-10

## 2022-08-03 RX ORDER — ONDANSETRON 4 MG/1
4 TABLET, ORALLY DISINTEGRATING ORAL EVERY 8 HOURS PRN
Qty: 12 TABLET | Refills: 0 | Status: SHIPPED | OUTPATIENT
Start: 2022-08-03

## 2022-08-03 RX ADMIN — KETOROLAC TROMETHAMINE 15 MG: 30 INJECTION, SOLUTION INTRAMUSCULAR at 01:08

## 2022-08-03 RX ADMIN — IOHEXOL 100 ML: 350 INJECTION, SOLUTION INTRAVENOUS at 01:08

## 2022-08-03 RX ADMIN — SODIUM CHLORIDE 1000 ML: 0.9 INJECTION, SOLUTION INTRAVENOUS at 01:08
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
## 2022-08-03 NOTE — DISCHARGE INSTRUCTIONS
You have been prescribed naproxen (naprosyn), an anti-inflammatory.  Take this medication whether you feel you need it or not.  Do not take ibuprofen, naproxen or other NSAID's medications while taking this medication. Do not take prescribed medications for at least 8h after medications given in the Emergency Department.  Return to the Emergency Department for any worsening, change in condition, or any emergent concerns.

## 2022-08-03 NOTE — ED NOTES
Pt to ED for left sided flank pain.  She is ambulating with a steady gait, speaking in full sentences, breathing even and unlabored.  Minor grimacing but nadn.  Vss.    LOC: Patient name and date of birth verified. The patient is awake, alert and aware of environment with an appropriate affect, the patient is oriented x 3 and speaking appropriately.   APPEARANCE: Patient resting comfortably, patient is clean and well groomed, patient's clothing is properly fastened.  SKIN: The skin is warm and dry, color consistent with ethnicity, patient has normal skin turgor and moist mucus membranes, skin intact, no breakdown or bruising noted.  MUSCULOSKELETAL: Patient moving all extremities well, no obvious swelling or deformities noted.   RESPIRATORY: Respirations are spontaneous, patient has a normal effort and rate, no accessory muscle use noted.  CARDIAC: Patient has a normal rate and rhythm, no periphreal edema noted, capillary refill < 3 seconds.  ABDOMEN: Soft and non tender to palpation, no distention noted. Bowel sounds present in all four quadrants.  NEUROLOGIC: Eyes open spontaneously, behavior appropriate to situation, follows commands, facial expression symmetrical, bilateral hand grasp equal and even, purposeful motor response noted, normal sensation in all extremities when touched with a finger.